# Patient Record
Sex: FEMALE | Race: ASIAN | Employment: STUDENT | URBAN - METROPOLITAN AREA
[De-identification: names, ages, dates, MRNs, and addresses within clinical notes are randomized per-mention and may not be internally consistent; named-entity substitution may affect disease eponyms.]

---
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
## 2020-11-17 PROBLEM — H04.123 DRY EYE SYNDROME OF BOTH EYES: Status: ACTIVE | Noted: 2020-11-17

## 2020-11-17 PROBLEM — M35.1 OVERLAP SYNDROME (HCC): Status: ACTIVE | Noted: 2020-11-17

## 2021-06-23 PROBLEM — F31.9 BIPOLAR DEPRESSION (HCC): Status: ACTIVE | Noted: 2021-06-23

## 2021-06-23 PROBLEM — M32.8 OTHER FORMS OF SYSTEMIC LUPUS ERYTHEMATOSUS (HCC): Status: ACTIVE | Noted: 2021-06-23

## 2022-06-16 ENCOUNTER — OFFICE VISIT (OUTPATIENT)
Dept: RHEUMATOLOGY | Facility: CLINIC | Age: 22
End: 2022-06-16
Payer: COMMERCIAL

## 2022-06-16 VITALS
HEIGHT: 64 IN | HEART RATE: 86 BPM | SYSTOLIC BLOOD PRESSURE: 108 MMHG | WEIGHT: 129 LBS | BODY MASS INDEX: 22.02 KG/M2 | DIASTOLIC BLOOD PRESSURE: 74 MMHG

## 2022-06-16 DIAGNOSIS — M25.50 POLYARTHRALGIA: ICD-10-CM

## 2022-06-16 DIAGNOSIS — M32.9 SYSTEMIC LUPUS ERYTHEMATOSUS, UNSPECIFIED SLE TYPE, UNSPECIFIED ORGAN INVOLVEMENT STATUS (HCC): Primary | ICD-10-CM

## 2022-06-16 RX ORDER — CLINDAMYCIN PHOSPHATE 10 MG/G
1 GEL TOPICAL EVERY MORNING
COMMUNITY
Start: 2022-03-14

## 2022-06-16 RX ORDER — PREDNISONE 10 MG/1
TABLET ORAL
Qty: 20 TABLET | Refills: 0 | Status: SHIPPED | OUTPATIENT
Start: 2022-06-16

## 2022-06-16 RX ORDER — LAMOTRIGINE 200 MG/1
200 TABLET ORAL 2 TIMES DAILY
COMMUNITY
Start: 2022-05-18

## 2022-06-16 RX ORDER — ADAPALENE AND BENZOYL PEROXIDE 3; 25 MG/G; MG/G
GEL TOPICAL DAILY
COMMUNITY
Start: 2022-03-14

## 2022-06-16 NOTE — PATIENT INSTRUCTIONS
You were seen today for lupus   Continue plaquenil  Start prednisone 30 mg daily x3 days then 20 mg daily x3 days then 10 mg daily x3 days then stop  See me in 6-8 weeks

## 2022-08-04 ENCOUNTER — LAB ENCOUNTER (OUTPATIENT)
Dept: LAB | Age: 22
End: 2022-08-04
Attending: INTERNAL MEDICINE
Payer: COMMERCIAL

## 2022-08-04 ENCOUNTER — OFFICE VISIT (OUTPATIENT)
Dept: RHEUMATOLOGY | Facility: CLINIC | Age: 22
End: 2022-08-04
Payer: COMMERCIAL

## 2022-08-04 VITALS
HEART RATE: 97 BPM | BODY MASS INDEX: 22.02 KG/M2 | SYSTOLIC BLOOD PRESSURE: 106 MMHG | WEIGHT: 129 LBS | HEIGHT: 64 IN | DIASTOLIC BLOOD PRESSURE: 84 MMHG

## 2022-08-04 DIAGNOSIS — M32.9 SYSTEMIC LUPUS ERYTHEMATOSUS, UNSPECIFIED SLE TYPE, UNSPECIFIED ORGAN INVOLVEMENT STATUS (HCC): Primary | ICD-10-CM

## 2022-08-04 DIAGNOSIS — Z51.81 ENCOUNTER FOR MEDICATION MONITORING: ICD-10-CM

## 2022-08-04 DIAGNOSIS — M32.9 SYSTEMIC LUPUS ERYTHEMATOSUS, UNSPECIFIED SLE TYPE, UNSPECIFIED ORGAN INVOLVEMENT STATUS (HCC): ICD-10-CM

## 2022-08-04 LAB
ALBUMIN SERPL-MCNC: 4.6 G/DL (ref 3.4–5)
ALT SERPL-CCNC: 17 U/L
AST SERPL-CCNC: 12 U/L (ref 15–37)
BASOPHILS # BLD AUTO: 0.04 X10(3) UL (ref 0–0.2)
BASOPHILS NFR BLD AUTO: 0.7 %
BILIRUB UR QL STRIP.AUTO: NEGATIVE
C3 SERPL-MCNC: 87.2 MG/DL (ref 90–180)
C4 SERPL-MCNC: 23.4 MG/DL (ref 10–40)
CLARITY UR REFRACT.AUTO: CLEAR
COLOR UR AUTO: YELLOW
CREAT BLD-MCNC: 0.84 MG/DL
CRP SERPL-MCNC: <0.29 MG/DL (ref ?–0.3)
EOSINOPHIL # BLD AUTO: 0.03 X10(3) UL (ref 0–0.7)
EOSINOPHIL NFR BLD AUTO: 0.5 %
ERYTHROCYTE [DISTWIDTH] IN BLOOD BY AUTOMATED COUNT: 11.9 %
ERYTHROCYTE [SEDIMENTATION RATE] IN BLOOD: 13 MM/HR
GFR SERPLBLD BASED ON 1.73 SQ M-ARVRAT: 101 ML/MIN/1.73M2 (ref 60–?)
GLUCOSE UR STRIP.AUTO-MCNC: NEGATIVE MG/DL
HCT VFR BLD AUTO: 43.8 %
HGB BLD-MCNC: 14.4 G/DL
IMM GRANULOCYTES # BLD AUTO: 0.01 X10(3) UL (ref 0–1)
IMM GRANULOCYTES NFR BLD: 0.2 %
KETONES UR STRIP.AUTO-MCNC: NEGATIVE MG/DL
LEUKOCYTE ESTERASE UR QL STRIP.AUTO: NEGATIVE
LYMPHOCYTES # BLD AUTO: 2.1 X10(3) UL (ref 1–4)
LYMPHOCYTES NFR BLD AUTO: 35 %
MCH RBC QN AUTO: 30.4 PG (ref 26–34)
MCHC RBC AUTO-ENTMCNC: 32.9 G/DL (ref 31–37)
MCV RBC AUTO: 92.6 FL
MONOCYTES # BLD AUTO: 0.54 X10(3) UL (ref 0.1–1)
MONOCYTES NFR BLD AUTO: 9 %
NEUTROPHILS # BLD AUTO: 3.28 X10 (3) UL (ref 1.5–7.7)
NEUTROPHILS # BLD AUTO: 3.28 X10(3) UL (ref 1.5–7.7)
NEUTROPHILS NFR BLD AUTO: 54.6 %
NITRITE UR QL STRIP.AUTO: NEGATIVE
PH UR STRIP.AUTO: 7.5 [PH] (ref 5–8)
PLATELET # BLD AUTO: 269 10(3)UL (ref 150–450)
PROT UR STRIP.AUTO-MCNC: NEGATIVE MG/DL
RBC # BLD AUTO: 4.73 X10(6)UL
RBC UR QL AUTO: NEGATIVE
SP GR UR STRIP.AUTO: 1.01 (ref 1–1.03)
UROBILINOGEN UR STRIP.AUTO-MCNC: 0.2 MG/DL
WBC # BLD AUTO: 6 X10(3) UL (ref 4–11)

## 2022-08-04 PROCEDURE — 86140 C-REACTIVE PROTEIN: CPT

## 2022-08-04 PROCEDURE — 82565 ASSAY OF CREATININE: CPT

## 2022-08-04 PROCEDURE — 84450 TRANSFERASE (AST) (SGOT): CPT

## 2022-08-04 PROCEDURE — 81003 URINALYSIS AUTO W/O SCOPE: CPT

## 2022-08-04 PROCEDURE — 86160 COMPLEMENT ANTIGEN: CPT

## 2022-08-04 PROCEDURE — 86225 DNA ANTIBODY NATIVE: CPT

## 2022-08-04 PROCEDURE — 85025 COMPLETE CBC W/AUTO DIFF WBC: CPT

## 2022-08-04 PROCEDURE — 82040 ASSAY OF SERUM ALBUMIN: CPT

## 2022-08-04 PROCEDURE — 85652 RBC SED RATE AUTOMATED: CPT

## 2022-08-04 PROCEDURE — 36415 COLL VENOUS BLD VENIPUNCTURE: CPT

## 2022-08-04 PROCEDURE — 84460 ALANINE AMINO (ALT) (SGPT): CPT

## 2022-08-16 LAB — DOUBLE-STRANDED DNA (DSDNA) AB IGG ELISA: 7 IU

## 2022-08-31 ENCOUNTER — TELEPHONE (OUTPATIENT)
Dept: RHEUMATOLOGY | Facility: CLINIC | Age: 22
End: 2022-08-31

## 2022-08-31 NOTE — TELEPHONE ENCOUNTER
Pt on the phone is stating that she has been having neck pain and a headache since Saturday. Pt believes the cause of that is the medication she was prescribed for her UTI. Pt read into the medication side effects and it stated it may cause meningitis and lupus. Pt will like to receive a call back regarding what she is able to do for this issue.  Please advise

## 2022-08-31 NOTE — TELEPHONE ENCOUNTER
Called patient back, she had a UTI about a week ago and developed a lot of neck pain and headache. Started Saturday and the symptoms are going away. She is very emotional on the phone, crying. She called her PCP and the nurse advised her to go to urgent care. No fevers. Also states that her lupus has been bad, she had pinkeye and some dry mouth  Advised to go to the ED, the need to rule out infection. Will likely need imaging.   She agreed

## 2022-09-08 ENCOUNTER — LAB ENCOUNTER (OUTPATIENT)
Dept: LAB | Age: 22
End: 2022-09-08
Attending: INTERNAL MEDICINE
Payer: COMMERCIAL

## 2022-09-08 ENCOUNTER — OFFICE VISIT (OUTPATIENT)
Dept: RHEUMATOLOGY | Facility: CLINIC | Age: 22
End: 2022-09-08
Payer: COMMERCIAL

## 2022-09-08 VITALS
BODY MASS INDEX: 21.34 KG/M2 | HEART RATE: 90 BPM | DIASTOLIC BLOOD PRESSURE: 92 MMHG | HEIGHT: 64 IN | WEIGHT: 125 LBS | SYSTOLIC BLOOD PRESSURE: 117 MMHG

## 2022-09-08 DIAGNOSIS — R39.15 URGENCY OF URINATION: ICD-10-CM

## 2022-09-08 DIAGNOSIS — M32.9 SYSTEMIC LUPUS ERYTHEMATOSUS, UNSPECIFIED SLE TYPE, UNSPECIFIED ORGAN INVOLVEMENT STATUS (HCC): Primary | ICD-10-CM

## 2022-09-08 LAB
BILIRUB UR QL: NEGATIVE
CLARITY UR: CLEAR
COLOR UR: YELLOW
GLUCOSE UR-MCNC: NEGATIVE MG/DL
HGB UR QL STRIP.AUTO: NEGATIVE
KETONES UR-MCNC: NEGATIVE MG/DL
NITRITE UR QL STRIP.AUTO: NEGATIVE
PH UR: 6.5 [PH] (ref 5–8)
PROT UR-MCNC: NEGATIVE MG/DL
SP GR UR STRIP: 1.01 (ref 1–1.03)
UROBILINOGEN UR STRIP-ACNC: 0.2

## 2022-09-08 PROCEDURE — 81001 URINALYSIS AUTO W/SCOPE: CPT | Performed by: INTERNAL MEDICINE

## 2022-09-08 PROCEDURE — 81015 MICROSCOPIC EXAM OF URINE: CPT | Performed by: INTERNAL MEDICINE

## 2022-09-08 NOTE — PATIENT INSTRUCTIONS
You were seen for recent headache and neck pain  The fluid from your spine showed a cell count of greater than 10, it was 13.   The glucose and protein in your spinal fluid was normal.  But this cell count is still high, please discuss with your primary care doctor if meningitis could be a consideration    Regarding her lupus, it does appear stable  We will get the urine test and urine culture  I gave you a referral for pain management

## 2022-09-19 ENCOUNTER — PATIENT MESSAGE (OUTPATIENT)
Dept: RHEUMATOLOGY | Facility: CLINIC | Age: 22
End: 2022-09-19

## 2022-10-12 ENCOUNTER — PATIENT MESSAGE (OUTPATIENT)
Dept: RHEUMATOLOGY | Facility: CLINIC | Age: 22
End: 2022-10-12

## 2022-10-12 DIAGNOSIS — M32.9 SYSTEMIC LUPUS ERYTHEMATOSUS, UNSPECIFIED SLE TYPE, UNSPECIFIED ORGAN INVOLVEMENT STATUS (HCC): ICD-10-CM

## 2022-10-12 DIAGNOSIS — M35.1 OVERLAP SYNDROME (HCC): ICD-10-CM

## 2022-10-12 RX ORDER — HYDROXYCHLOROQUINE SULFATE 200 MG/1
TABLET, FILM COATED ORAL
Qty: 135 TABLET | Refills: 0 | Status: SHIPPED | OUTPATIENT
Start: 2022-10-12

## 2022-10-12 NOTE — TELEPHONE ENCOUNTER
From: Carlin Reis  To: Ema Stone MD  Sent: 10/12/2022 2:26 PM CDT  Subject: Prescription refill    Hello,     I am wondering if it would be possible for a new refill of the hydroxychloroquine 200 mg can be sent to the [facility] on [address], [place], [address]. Thank you.

## 2022-11-21 ENCOUNTER — OFFICE VISIT (OUTPATIENT)
Dept: RHEUMATOLOGY | Facility: CLINIC | Age: 22
End: 2022-11-21
Payer: COMMERCIAL

## 2022-11-21 VITALS
HEART RATE: 102 BPM | BODY MASS INDEX: 21.68 KG/M2 | SYSTOLIC BLOOD PRESSURE: 123 MMHG | DIASTOLIC BLOOD PRESSURE: 90 MMHG | WEIGHT: 127 LBS | HEIGHT: 64 IN

## 2022-11-21 DIAGNOSIS — R39.15 URGENCY OF URINATION: ICD-10-CM

## 2022-11-21 DIAGNOSIS — Z51.81 ENCOUNTER FOR MEDICATION MONITORING: ICD-10-CM

## 2022-11-21 DIAGNOSIS — M32.9 SYSTEMIC LUPUS ERYTHEMATOSUS, UNSPECIFIED SLE TYPE, UNSPECIFIED ORGAN INVOLVEMENT STATUS (HCC): Primary | ICD-10-CM

## 2022-11-21 RX ORDER — PREDNISONE 10 MG/1
TABLET ORAL
Qty: 12 TABLET | Refills: 0 | Status: SHIPPED | OUTPATIENT
Start: 2022-11-21

## 2022-11-21 NOTE — PATIENT INSTRUCTIONS
Were seen today for lupus  Plan to get blood work today  Continue Plaquenil  Try taking prednisone 30 mg daily for 2 days then 20 mg daily for 2 days then 10 mg daily for 2 days to help with some of your joint pain and fatigue

## 2022-11-22 ENCOUNTER — LAB ENCOUNTER (OUTPATIENT)
Dept: LAB | Age: 22
End: 2022-11-22
Attending: INTERNAL MEDICINE
Payer: COMMERCIAL

## 2022-11-22 DIAGNOSIS — R39.15 URGENCY OF URINATION: ICD-10-CM

## 2022-11-22 DIAGNOSIS — Z51.81 ENCOUNTER FOR MEDICATION MONITORING: ICD-10-CM

## 2022-11-22 DIAGNOSIS — M32.9 SYSTEMIC LUPUS ERYTHEMATOSUS, UNSPECIFIED SLE TYPE, UNSPECIFIED ORGAN INVOLVEMENT STATUS (HCC): ICD-10-CM

## 2022-11-22 LAB
ALBUMIN SERPL-MCNC: 4.6 G/DL (ref 3.4–5)
ALT SERPL-CCNC: 17 U/L
AST SERPL-CCNC: 9 U/L (ref 15–37)
BASOPHILS # BLD AUTO: 0.05 X10(3) UL (ref 0–0.2)
BASOPHILS NFR BLD AUTO: 1 %
BILIRUB UR QL STRIP.AUTO: NEGATIVE
C3 SERPL-MCNC: 86.5 MG/DL (ref 90–180)
C4 SERPL-MCNC: 24.1 MG/DL (ref 10–40)
CLARITY UR REFRACT.AUTO: CLEAR
COLOR UR AUTO: YELLOW
CREAT BLD-MCNC: 0.87 MG/DL
CRP SERPL-MCNC: <0.29 MG/DL (ref ?–0.3)
EOSINOPHIL # BLD AUTO: 0.06 X10(3) UL (ref 0–0.7)
EOSINOPHIL NFR BLD AUTO: 1.2 %
ERYTHROCYTE [DISTWIDTH] IN BLOOD BY AUTOMATED COUNT: 11.7 %
ERYTHROCYTE [SEDIMENTATION RATE] IN BLOOD: 1 MM/HR
GFR SERPLBLD BASED ON 1.73 SQ M-ARVRAT: 97 ML/MIN/1.73M2 (ref 60–?)
GLUCOSE UR STRIP.AUTO-MCNC: NEGATIVE MG/DL
HCT VFR BLD AUTO: 44.1 %
HGB BLD-MCNC: 14.5 G/DL
IMM GRANULOCYTES # BLD AUTO: 0.01 X10(3) UL (ref 0–1)
IMM GRANULOCYTES NFR BLD: 0.2 %
KETONES UR STRIP.AUTO-MCNC: NEGATIVE MG/DL
LEUKOCYTE ESTERASE UR QL STRIP.AUTO: NEGATIVE
LYMPHOCYTES # BLD AUTO: 1.67 X10(3) UL (ref 1–4)
LYMPHOCYTES NFR BLD AUTO: 32.7 %
MCH RBC QN AUTO: 30.9 PG (ref 26–34)
MCHC RBC AUTO-ENTMCNC: 32.9 G/DL (ref 31–37)
MCV RBC AUTO: 93.8 FL
MONOCYTES # BLD AUTO: 0.46 X10(3) UL (ref 0.1–1)
MONOCYTES NFR BLD AUTO: 9 %
NEUTROPHILS # BLD AUTO: 2.86 X10 (3) UL (ref 1.5–7.7)
NEUTROPHILS # BLD AUTO: 2.86 X10(3) UL (ref 1.5–7.7)
NEUTROPHILS NFR BLD AUTO: 55.9 %
NITRITE UR QL STRIP.AUTO: NEGATIVE
PH UR STRIP.AUTO: 6 [PH] (ref 5–8)
PLATELET # BLD AUTO: 280 10(3)UL (ref 150–450)
PROT UR STRIP.AUTO-MCNC: NEGATIVE MG/DL
RBC # BLD AUTO: 4.7 X10(6)UL
SP GR UR STRIP.AUTO: <=1.005 (ref 1–1.03)
UROBILINOGEN UR STRIP.AUTO-MCNC: 0.2 MG/DL
WBC # BLD AUTO: 5.1 X10(3) UL (ref 4–11)

## 2022-11-22 PROCEDURE — 82040 ASSAY OF SERUM ALBUMIN: CPT

## 2022-11-22 PROCEDURE — 86225 DNA ANTIBODY NATIVE: CPT

## 2022-11-22 PROCEDURE — 84460 ALANINE AMINO (ALT) (SGPT): CPT

## 2022-11-22 PROCEDURE — 85025 COMPLETE CBC W/AUTO DIFF WBC: CPT

## 2022-11-22 PROCEDURE — 87086 URINE CULTURE/COLONY COUNT: CPT

## 2022-11-22 PROCEDURE — 82565 ASSAY OF CREATININE: CPT

## 2022-11-22 PROCEDURE — 85652 RBC SED RATE AUTOMATED: CPT

## 2022-11-22 PROCEDURE — 86160 COMPLEMENT ANTIGEN: CPT

## 2022-11-22 PROCEDURE — 84450 TRANSFERASE (AST) (SGOT): CPT

## 2022-11-22 PROCEDURE — 81001 URINALYSIS AUTO W/SCOPE: CPT

## 2022-11-22 PROCEDURE — 36415 COLL VENOUS BLD VENIPUNCTURE: CPT

## 2022-11-22 PROCEDURE — 86140 C-REACTIVE PROTEIN: CPT

## 2022-11-22 PROCEDURE — 81015 MICROSCOPIC EXAM OF URINE: CPT

## 2022-11-25 LAB — DSDNA IGG SERPL IA-ACNC: 4.8 IU/ML

## 2023-01-12 DIAGNOSIS — M32.9 SYSTEMIC LUPUS ERYTHEMATOSUS, UNSPECIFIED SLE TYPE, UNSPECIFIED ORGAN INVOLVEMENT STATUS (HCC): ICD-10-CM

## 2023-01-12 DIAGNOSIS — M35.1 OVERLAP SYNDROME (HCC): ICD-10-CM

## 2023-01-13 RX ORDER — HYDROXYCHLOROQUINE SULFATE 200 MG/1
TABLET, FILM COATED ORAL
Qty: 135 TABLET | Refills: 0 | Status: SHIPPED | OUTPATIENT
Start: 2023-01-13

## 2023-01-13 NOTE — TELEPHONE ENCOUNTER
Disp Refills Start End    hydroxychloroquine 200 MG Oral Tab 135 tablet 0 10/12/2022       LOV: 11/21/22  No future appointments.   Labs:   Component      Latest Ref Rng & Units 11/22/2022   WBC      4.0 - 11.0 x10(3) uL 5.1   RBC      3.80 - 5.30 x10(6)uL 4.70   Hemoglobin      12.0 - 16.0 g/dL 14.5   Hematocrit      35.0 - 48.0 % 44.1   Platelet Count      646.1 - 450.0 10(3)uL 280.0   MCV      80.0 - 100.0 fL 93.8   MCH      26.0 - 34.0 pg 30.9   MCHC      31.0 - 37.0 g/dL 32.9   RDW      % 11.7   Prelim Neutrophil Abs      1.50 - 7.70 x10 (3) uL 2.86   Neutrophils Absolute      1.50 - 7.70 x10(3) uL 2.86   Lymphocytes Absolute      1.00 - 4.00 x10(3) uL 1.67   Monocytes Absolute      0.10 - 1.00 x10(3) uL 0.46   Eosinophils Absolute      0.00 - 0.70 x10(3) uL 0.06   Basophils Absolute      0.00 - 0.20 x10(3) uL 0.05   Immature Granulocyte Absolute      0.00 - 1.00 x10(3) uL 0.01   Neutrophils %      % 55.9   Lymphocytes %      % 32.7   Monocytes %      % 9.0   Eosinophils %      % 1.2   Basophils %      % 1.0   Immature Granulocyte %      % 0.2   Color Urine      Yellow Yellow   Clarity Urine      Clear Clear   Spec Gravity      1.001 - 1.030 <=1.005   Glucose Urine      Negative mg/dL Negative   Bilirubin Urine      Negative Negative   Ketones, UA      Negative mg/dL Negative   Blood Urine      Negative Trace-Intact (A)   PH Urine      5.0 - 8.0 6.0   Protein Urine      Negative mg/dL Negative   Urobilinogen Urine      <2.0 mg/dL 0.2   Nitrite Urine      Negative Negative   Leukocyte Esterase Urine      Negative Negative   WBC Urine      0 - 5 /HPF 1-5   RBC Urine      0 - 2 /HPF None Seen   Bacteria Urine      None Seen /HPF Rare (A)   SQUAM EPI CELLS UR      None Seen /HPF Few (A)   RENAL TUBULAR EPITHELIAL CELLS      None Seen /HPF None Seen   TRANSITIONAL EPI CELLS      None Seen /HPF None Seen   YEAST URINE      None Seen /HPF None Seen   CREATININE      0.55 - 1.02 mg/dL 0.87   eGFR-Cr      >=60 mL/min/1.73m2 97   Albumin      3.4 - 5.0 g/dL 4.6   ALT (SGPT)      13 - 56 U/L 17   AST (SGOT)      15 - 37 U/L 9 (L)   C-REACTIVE PROTEIN      <0.30 mg/dL <0.29   SED RATE      0 - 20 mm/Hr 1   Anti-dsDNA Anibody, IGG      <10 IU/mL 4.8   COMPLEMENT C4      10.0 - 40.0 mg/dL 24.1   COMPLEMENT C3      90.0 - 180.0 mg/dL 86.5 (L)   Instructions    Were seen today for lupus  Plan to get blood work today  Continue Plaquenil  Try taking prednisone 30 mg daily for 2 days then 20 mg daily for 2 days then 10 mg daily for 2 days to help with some of your joint pain and fatigue

## 2023-01-20 ENCOUNTER — TELEPHONE (OUTPATIENT)
Dept: RHEUMATOLOGY | Facility: CLINIC | Age: 23
End: 2023-01-20

## 2023-01-20 DIAGNOSIS — M35.1 OVERLAP SYNDROME (HCC): ICD-10-CM

## 2023-01-20 DIAGNOSIS — M32.9 SYSTEMIC LUPUS ERYTHEMATOSUS, UNSPECIFIED SLE TYPE, UNSPECIFIED ORGAN INVOLVEMENT STATUS (HCC): ICD-10-CM

## 2023-01-20 RX ORDER — HYDROXYCHLOROQUINE SULFATE 200 MG/1
TABLET, FILM COATED ORAL
Qty: 135 TABLET | Refills: 0 | Status: SHIPPED | OUTPATIENT
Start: 2023-01-20

## 2023-01-20 NOTE — TELEPHONE ENCOUNTER
Per Gaviota Sher in [facility], patient is with them and awaiting for her hydroxychloroquine and it was wrongfully send to [facility] please refax to [facility] pharmacy on file.

## 2023-01-20 NOTE — TELEPHONE ENCOUNTER
Sent to wrong pool. Pt is not IM or FM clinic pt. Routed to prescribing physician. Med is pended for requested pharmacy.

## 2023-03-15 ENCOUNTER — LAB ENCOUNTER (OUTPATIENT)
Dept: LAB | Facility: HOSPITAL | Age: 23
End: 2023-03-15
Attending: INTERNAL MEDICINE
Payer: COMMERCIAL

## 2023-03-15 ENCOUNTER — OFFICE VISIT (OUTPATIENT)
Dept: RHEUMATOLOGY | Facility: CLINIC | Age: 23
End: 2023-03-15

## 2023-03-15 VITALS
WEIGHT: 115 LBS | DIASTOLIC BLOOD PRESSURE: 84 MMHG | HEIGHT: 64 IN | BODY MASS INDEX: 19.63 KG/M2 | HEART RATE: 86 BPM | SYSTOLIC BLOOD PRESSURE: 116 MMHG

## 2023-03-15 DIAGNOSIS — M32.9 SYSTEMIC LUPUS ERYTHEMATOSUS, UNSPECIFIED SLE TYPE, UNSPECIFIED ORGAN INVOLVEMENT STATUS (HCC): ICD-10-CM

## 2023-03-15 DIAGNOSIS — Z51.81 ENCOUNTER FOR MEDICATION MONITORING: ICD-10-CM

## 2023-03-15 DIAGNOSIS — M32.9 SYSTEMIC LUPUS ERYTHEMATOSUS, UNSPECIFIED SLE TYPE, UNSPECIFIED ORGAN INVOLVEMENT STATUS (HCC): Primary | ICD-10-CM

## 2023-03-15 LAB
ALBUMIN SERPL-MCNC: 4.2 G/DL (ref 3.4–5)
ALT SERPL-CCNC: 13 U/L
AST SERPL-CCNC: 8 U/L (ref 15–37)
BASOPHILS # BLD AUTO: 0.04 X10(3) UL (ref 0–0.2)
BASOPHILS NFR BLD AUTO: 0.9 %
BILIRUB UR QL: NEGATIVE
C3 SERPL-MCNC: 74.6 MG/DL (ref 90–180)
C4 SERPL-MCNC: 21.8 MG/DL (ref 10–40)
CLARITY UR: CLEAR
COLOR UR: YELLOW
CREAT BLD-MCNC: 0.78 MG/DL
CREAT UR-SCNC: 88.7 MG/DL
CRP SERPL-MCNC: <0.29 MG/DL (ref ?–0.3)
DEPRECATED RDW RBC AUTO: 40.1 FL (ref 35.1–46.3)
EOSINOPHIL # BLD AUTO: 0.03 X10(3) UL (ref 0–0.7)
EOSINOPHIL NFR BLD AUTO: 0.7 %
ERYTHROCYTE [DISTWIDTH] IN BLOOD BY AUTOMATED COUNT: 11.8 % (ref 11–15)
ERYTHROCYTE [SEDIMENTATION RATE] IN BLOOD: 1 MM/HR
GFR SERPLBLD BASED ON 1.73 SQ M-ARVRAT: 110 ML/MIN/1.73M2 (ref 60–?)
GLUCOSE UR-MCNC: NEGATIVE MG/DL
HCT VFR BLD AUTO: 40.7 %
HGB BLD-MCNC: 13.6 G/DL
IMM GRANULOCYTES # BLD AUTO: 0.01 X10(3) UL (ref 0–1)
IMM GRANULOCYTES NFR BLD: 0.2 %
KETONES UR-MCNC: NEGATIVE MG/DL
LYMPHOCYTES # BLD AUTO: 1.45 X10(3) UL (ref 1–4)
LYMPHOCYTES NFR BLD AUTO: 33.4 %
MCH RBC QN AUTO: 30.7 PG (ref 26–34)
MCHC RBC AUTO-ENTMCNC: 33.4 G/DL (ref 31–37)
MCV RBC AUTO: 91.9 FL
MONOCYTES # BLD AUTO: 0.33 X10(3) UL (ref 0.1–1)
MONOCYTES NFR BLD AUTO: 7.6 %
NEUTROPHILS # BLD AUTO: 2.48 X10 (3) UL (ref 1.5–7.7)
NEUTROPHILS # BLD AUTO: 2.48 X10(3) UL (ref 1.5–7.7)
NEUTROPHILS NFR BLD AUTO: 57.2 %
NITRITE UR QL STRIP.AUTO: NEGATIVE
PH UR: 6 [PH] (ref 5–8)
PLATELET # BLD AUTO: 249 10(3)UL (ref 150–450)
PROT UR-MCNC: 13.9 MG/DL
PROT UR-MCNC: NEGATIVE MG/DL
PROT/CREAT UR-RTO: 0.16
RBC # BLD AUTO: 4.43 X10(6)UL
SP GR UR STRIP: 1.01 (ref 1–1.03)
UROBILINOGEN UR STRIP-ACNC: <2
VIT C UR-MCNC: NEGATIVE MG/DL
WBC # BLD AUTO: 4.3 X10(3) UL (ref 4–11)

## 2023-03-15 PROCEDURE — 84460 ALANINE AMINO (ALT) (SGPT): CPT

## 2023-03-15 PROCEDURE — 85652 RBC SED RATE AUTOMATED: CPT

## 2023-03-15 PROCEDURE — 82565 ASSAY OF CREATININE: CPT

## 2023-03-15 PROCEDURE — 3074F SYST BP LT 130 MM HG: CPT | Performed by: INTERNAL MEDICINE

## 2023-03-15 PROCEDURE — 99214 OFFICE O/P EST MOD 30 MIN: CPT | Performed by: INTERNAL MEDICINE

## 2023-03-15 PROCEDURE — 86225 DNA ANTIBODY NATIVE: CPT

## 2023-03-15 PROCEDURE — 86140 C-REACTIVE PROTEIN: CPT

## 2023-03-15 PROCEDURE — 82040 ASSAY OF SERUM ALBUMIN: CPT

## 2023-03-15 PROCEDURE — 84156 ASSAY OF PROTEIN URINE: CPT

## 2023-03-15 PROCEDURE — 36415 COLL VENOUS BLD VENIPUNCTURE: CPT

## 2023-03-15 PROCEDURE — 81001 URINALYSIS AUTO W/SCOPE: CPT

## 2023-03-15 PROCEDURE — 86160 COMPLEMENT ANTIGEN: CPT

## 2023-03-15 PROCEDURE — 84450 TRANSFERASE (AST) (SGOT): CPT

## 2023-03-15 PROCEDURE — 85025 COMPLETE CBC W/AUTO DIFF WBC: CPT

## 2023-03-15 PROCEDURE — 82570 ASSAY OF URINE CREATININE: CPT

## 2023-03-15 PROCEDURE — 3008F BODY MASS INDEX DOCD: CPT | Performed by: INTERNAL MEDICINE

## 2023-03-15 PROCEDURE — 3079F DIAST BP 80-89 MM HG: CPT | Performed by: INTERNAL MEDICINE

## 2023-03-15 NOTE — PATIENT INSTRUCTIONS
You were seen for lupus/Sjogren's  Continue Plaquenil  Blood work today  Can follow-up and get blood work in 6 months

## 2023-03-16 ENCOUNTER — OFFICE VISIT (OUTPATIENT)
Dept: SURGERY | Facility: CLINIC | Age: 23
End: 2023-03-16

## 2023-03-16 DIAGNOSIS — R82.90 URINE FINDING: ICD-10-CM

## 2023-03-16 DIAGNOSIS — R35.0 URINARY FREQUENCY: ICD-10-CM

## 2023-03-16 DIAGNOSIS — N30.90 RECURRENT CYSTITIS: Primary | ICD-10-CM

## 2023-03-16 LAB
APPEARANCE: CLEAR
BILIRUBIN: NEGATIVE
GLUCOSE (URINE DIPSTICK): NEGATIVE MG/DL
KETONES (URINE DIPSTICK): NEGATIVE MG/DL
MULTISTIX LOT#: ABNORMAL NUMERIC
NITRITE, URINE: NEGATIVE
PH, URINE: 7 (ref 4.5–8)
PROTEIN (URINE DIPSTICK): NEGATIVE MG/DL
SPECIFIC GRAVITY: 1.01 (ref 1–1.03)
URINE-COLOR: YELLOW
UROBILINOGEN,SEMI-QN: 0.2 MG/DL (ref 0–1.9)

## 2023-03-16 PROCEDURE — 81003 URINALYSIS AUTO W/O SCOPE: CPT | Performed by: PHYSICIAN ASSISTANT

## 2023-03-16 PROCEDURE — 99204 OFFICE O/P NEW MOD 45 MIN: CPT | Performed by: PHYSICIAN ASSISTANT

## 2023-03-16 RX ORDER — SUMATRIPTAN 100 MG/1
1 TABLET, FILM COATED ORAL AS NEEDED
COMMUNITY
Start: 2022-09-09

## 2023-03-17 LAB — DSDNA AB TITR SER: <10 {TITER}

## 2023-04-18 DIAGNOSIS — M32.9 SYSTEMIC LUPUS ERYTHEMATOSUS, UNSPECIFIED SLE TYPE, UNSPECIFIED ORGAN INVOLVEMENT STATUS (HCC): ICD-10-CM

## 2023-04-18 DIAGNOSIS — M35.1 OVERLAP SYNDROME (HCC): ICD-10-CM

## 2023-04-18 NOTE — TELEPHONE ENCOUNTER
Dr Ban Harrington- okay to generate note as requested below? Thank you. 4 = No assist / stand by assistance

## 2023-04-19 RX ORDER — HYDROXYCHLOROQUINE SULFATE 200 MG/1
TABLET, FILM COATED ORAL
Qty: 135 TABLET | Refills: 0 | Status: SHIPPED | OUTPATIENT
Start: 2023-04-19

## 2023-08-08 ENCOUNTER — TELEPHONE (OUTPATIENT)
Dept: RHEUMATOLOGY | Facility: CLINIC | Age: 23
End: 2023-08-08

## 2023-08-08 NOTE — TELEPHONE ENCOUNTER
Pt needs her standing lab orders from Dr Makayla Syed faxed to [facility] lab in [place] at:  Fax: [phone]  Ph: [phone]      Even though labs say [facility] can see them in [facility], [address] said they cannot see them.     Call patient when faxed over at: [phone]

## 2023-08-08 NOTE — TELEPHONE ENCOUNTER
Patient is calling back and states that the fax# that was used was her direct phone number and not the fax # for [facility]. Per patient fax# is going to be as follows.     Fax# [phone]

## 2023-08-11 ENCOUNTER — OFFICE VISIT (OUTPATIENT)
Dept: RHEUMATOLOGY | Facility: CLINIC | Age: 23
End: 2023-08-11

## 2023-08-11 ENCOUNTER — LAB ENCOUNTER (OUTPATIENT)
Dept: LAB | Facility: HOSPITAL | Age: 23
End: 2023-08-11
Attending: INTERNAL MEDICINE
Payer: COMMERCIAL

## 2023-08-11 VITALS
BODY MASS INDEX: 18.46 KG/M2 | DIASTOLIC BLOOD PRESSURE: 84 MMHG | WEIGHT: 108.13 LBS | HEART RATE: 76 BPM | SYSTOLIC BLOOD PRESSURE: 115 MMHG | HEIGHT: 64 IN

## 2023-08-11 DIAGNOSIS — M25.50 POLYARTHRALGIA: ICD-10-CM

## 2023-08-11 DIAGNOSIS — Z51.81 ENCOUNTER FOR MEDICATION MONITORING: ICD-10-CM

## 2023-08-11 DIAGNOSIS — M32.9 SYSTEMIC LUPUS ERYTHEMATOSUS, UNSPECIFIED SLE TYPE, UNSPECIFIED ORGAN INVOLVEMENT STATUS (HCC): Primary | ICD-10-CM

## 2023-08-11 DIAGNOSIS — M32.9 SYSTEMIC LUPUS ERYTHEMATOSUS, UNSPECIFIED SLE TYPE, UNSPECIFIED ORGAN INVOLVEMENT STATUS (HCC): ICD-10-CM

## 2023-08-11 LAB
ALBUMIN SERPL-MCNC: 4.4 G/DL (ref 3.4–5)
ALBUMIN/GLOB SERPL: 1.6 {RATIO} (ref 1–2)
ALP LIVER SERPL-CCNC: 58 U/L
ALT SERPL-CCNC: 14 U/L
ANION GAP SERPL CALC-SCNC: 7 MMOL/L (ref 0–18)
AST SERPL-CCNC: 15 U/L (ref 15–37)
BASOPHILS # BLD AUTO: 0.03 X10(3) UL (ref 0–0.2)
BASOPHILS NFR BLD AUTO: 0.6 %
BILIRUB SERPL-MCNC: 0.5 MG/DL (ref 0.1–2)
BILIRUB UR QL: NEGATIVE
BUN BLD-MCNC: 8 MG/DL (ref 7–18)
BUN/CREAT SERPL: 9.9 (ref 10–20)
C3 SERPL-MCNC: 77.8 MG/DL (ref 90–180)
C4 SERPL-MCNC: 20.8 MG/DL (ref 10–40)
CALCIUM BLD-MCNC: 8.8 MG/DL (ref 8.5–10.1)
CHLORIDE SERPL-SCNC: 105 MMOL/L (ref 98–112)
CLARITY UR: CLEAR
CO2 SERPL-SCNC: 27 MMOL/L (ref 21–32)
CREAT BLD-MCNC: 0.81 MG/DL
CREAT UR-SCNC: 111 MG/DL
CRP SERPL-MCNC: <0.29 MG/DL (ref ?–0.3)
DEPRECATED RDW RBC AUTO: 38.8 FL (ref 35.1–46.3)
EGFRCR SERPLBLD CKD-EPI 2021: 105 ML/MIN/1.73M2 (ref 60–?)
EOSINOPHIL # BLD AUTO: 0.06 X10(3) UL (ref 0–0.7)
EOSINOPHIL NFR BLD AUTO: 1.3 %
ERYTHROCYTE [DISTWIDTH] IN BLOOD BY AUTOMATED COUNT: 11.9 % (ref 11–15)
ERYTHROCYTE [SEDIMENTATION RATE] IN BLOOD: 1 MM/HR
FASTING STATUS PATIENT QL REPORTED: NO
GLOBULIN PLAS-MCNC: 2.7 G/DL (ref 2.8–4.4)
GLUCOSE BLD-MCNC: 81 MG/DL (ref 70–99)
GLUCOSE UR-MCNC: NORMAL MG/DL
HCT VFR BLD AUTO: 41.1 %
HGB BLD-MCNC: 13.8 G/DL
HGB UR QL STRIP.AUTO: NEGATIVE
IMM GRANULOCYTES # BLD AUTO: 0 X10(3) UL (ref 0–1)
IMM GRANULOCYTES NFR BLD: 0 %
KETONES UR-MCNC: NEGATIVE MG/DL
LEUKOCYTE ESTERASE UR QL STRIP.AUTO: 75
LYMPHOCYTES # BLD AUTO: 1.87 X10(3) UL (ref 1–4)
LYMPHOCYTES NFR BLD AUTO: 40 %
MCH RBC QN AUTO: 30.2 PG (ref 26–34)
MCHC RBC AUTO-ENTMCNC: 33.6 G/DL (ref 31–37)
MCV RBC AUTO: 89.9 FL
MONOCYTES # BLD AUTO: 0.47 X10(3) UL (ref 0.1–1)
MONOCYTES NFR BLD AUTO: 10.1 %
NEUTROPHILS # BLD AUTO: 2.24 X10 (3) UL (ref 1.5–7.7)
NEUTROPHILS # BLD AUTO: 2.24 X10(3) UL (ref 1.5–7.7)
NEUTROPHILS NFR BLD AUTO: 48 %
NITRITE UR QL STRIP.AUTO: NEGATIVE
OSMOLALITY SERPL CALC.SUM OF ELEC: 285 MOSM/KG (ref 275–295)
PH UR: 5.5 [PH] (ref 5–8)
PLATELET # BLD AUTO: 227 10(3)UL (ref 150–450)
POTASSIUM SERPL-SCNC: 3.5 MMOL/L (ref 3.5–5.1)
PROT SERPL-MCNC: 7.1 G/DL (ref 6.4–8.2)
PROT UR-MCNC: 16.2 MG/DL
PROT/CREAT UR-RTO: 0.15
RBC # BLD AUTO: 4.57 X10(6)UL
SODIUM SERPL-SCNC: 139 MMOL/L (ref 136–145)
SP GR UR STRIP: 1.01 (ref 1–1.03)
UROBILINOGEN UR STRIP-ACNC: NORMAL
WBC # BLD AUTO: 4.7 X10(3) UL (ref 4–11)

## 2023-08-11 PROCEDURE — 84156 ASSAY OF PROTEIN URINE: CPT

## 2023-08-11 PROCEDURE — 3008F BODY MASS INDEX DOCD: CPT | Performed by: INTERNAL MEDICINE

## 2023-08-11 PROCEDURE — 3079F DIAST BP 80-89 MM HG: CPT | Performed by: INTERNAL MEDICINE

## 2023-08-11 PROCEDURE — 85025 COMPLETE CBC W/AUTO DIFF WBC: CPT | Performed by: INTERNAL MEDICINE

## 2023-08-11 PROCEDURE — 86140 C-REACTIVE PROTEIN: CPT | Performed by: INTERNAL MEDICINE

## 2023-08-11 PROCEDURE — 3074F SYST BP LT 130 MM HG: CPT | Performed by: INTERNAL MEDICINE

## 2023-08-11 PROCEDURE — 99214 OFFICE O/P EST MOD 30 MIN: CPT | Performed by: INTERNAL MEDICINE

## 2023-08-11 PROCEDURE — 36415 COLL VENOUS BLD VENIPUNCTURE: CPT | Performed by: INTERNAL MEDICINE

## 2023-08-11 PROCEDURE — 81001 URINALYSIS AUTO W/SCOPE: CPT | Performed by: INTERNAL MEDICINE

## 2023-08-11 PROCEDURE — 85652 RBC SED RATE AUTOMATED: CPT | Performed by: INTERNAL MEDICINE

## 2023-08-11 PROCEDURE — 82570 ASSAY OF URINE CREATININE: CPT

## 2023-08-11 PROCEDURE — 86160 COMPLEMENT ANTIGEN: CPT | Performed by: INTERNAL MEDICINE

## 2023-08-11 PROCEDURE — 80053 COMPREHEN METABOLIC PANEL: CPT | Performed by: INTERNAL MEDICINE

## 2023-08-11 RX ORDER — MELOXICAM 7.5 MG/1
7.5 TABLET ORAL 2 TIMES DAILY
Qty: 60 TABLET | Refills: 0 | Status: SHIPPED | OUTPATIENT
Start: 2023-08-11

## 2023-08-11 RX ORDER — MELOXICAM 7.5 MG/1
7.5 TABLET ORAL 2 TIMES DAILY
Qty: 60 TABLET | Refills: 0 | Status: SHIPPED | OUTPATIENT
Start: 2023-08-11 | End: 2023-08-11

## 2023-08-11 RX ORDER — METHYLPREDNISOLONE 4 MG/1
TABLET ORAL
Qty: 1 EACH | Refills: 0 | Status: SHIPPED | OUTPATIENT
Start: 2023-08-11

## 2023-08-11 RX ORDER — METHYLPREDNISOLONE 4 MG/1
TABLET ORAL
Qty: 1 EACH | Refills: 0 | Status: SHIPPED | OUTPATIENT
Start: 2023-08-11 | End: 2023-08-11

## 2023-08-11 NOTE — PROGRESS NOTES
Miladys Craig is a 25year old female. HPI:   Patient presents with: Follow - Up  SLE      I had the pleasure of seeing Miladys Craig on 8/11/2023 for follow up SLE/Sjogren's      Current medications:   mg MWF and other days 200 mg daily- since Nov 2020  Blood work:  Neg RF, CCP (2021)  2020 and 2016:  +DION,+RNP>8, +SSA>8, SSB>8  2020: +dsDNA 1:20  2007: +DION 1:1280 speckled, +SSA, SSB    Interval History: This is a 25 yo F with hx of Bipolar disorder presents to establish care for lupus. She was initially diagnosed when she was 6 at that time had a butterfly rash. Never had any biopsy. She was placed on hydroxychloroquine at that time. She would have medication when she was 8 and was in remission. In August 2020 she started to feel fatigue with joint and muscle pain all over. She was following with rheumatologist Dr. Edmundo Soto. She was placed on prednisone for about 10 days which helped her pain. She has been maintained on Plaquenil 400 mg Monday Wednesday Friday and 200 mg the other days. Overall she was doing well on this regimen. In February 2022 she developed appendicitis and since then has been having worsening pain all over. Her joints ache and feel very uncomfortable. Her joints feel like they are burning. No redness or swelling noted. Denies any oral ulcers, lymphadenopathy, hair loss, serositis, DVT or PE, RP or miscarriages. Reports dry eyes and dry mouth but not significant. Last eye exam was in November 2021 8/4/2022:   Presents for follow-up of SLE  During last visit she mentioned worsening joint pain around her thighs and her knees. They would feel like a burning sensation but no redness or swelling  She was given some prednisone during her last visit and it helped with her symptoms. She is doing better overall  She has acne and is seeing a dermatologist.  She was given retinol but noticed that her skin will become more red.   She also noticed that the sun to make it worse    9/8/2022:   Presents for follow-up of SLE  She had a UTI 2 weeks ago and was on antibiotics, then she developed a significant headache and neck pain. She was advised to go to the ED. She was at college at that time  She ended up in the ED and they did an LP which showed total nucleated cell count of 13. Continues to have a headache. No fevers. Feeling very tired and taking several naps  Continues to have hip and knee pain which is chronic. No other active symptoms of lupus  Blood work was done in the ED showing normal ESR and CRP  She continues to have urinary urgency issues    11/21/2022:   Presents for follow-up of SLE  Also being treated for Migraines now on sumatriptan as needed   On HCQ alternating doses 400/200 mg every other day  Having a lot of fatigue and joint pain. Fatigue is worse   Had thyroid test and normal  Joint pain is also worse, mostly in hips and knees  Some pain in hands, not in elbows or shoulders  No new rashes  Also has low appetite and gets nauseous easily  Also having urinary urgency    3/15/2023:   Presents for follow-up of SLE/Sjogren's   On HCQ. Has some joint pain and worse with weather changes. Not debilitating. mostly in hips and knees  Also has some pain in hands ahmet with weather changes. Able to make fist  Still having recurrent UTI, will be seeing urology   Also getting more sores in the mouth and mucoseal, not painful  Was given steroids for her pain but never took it   Feels subjective fevers, uses ice packs almost daily    8/11/2023:   Presents for follow-up of SLE/Sjogren's   On HCQ  In May had a lot of fatigue and pain all over and had to medrol dose pack, it did help the symptoms  Having some pain in the joints ahmet with weather changes. Still functional. Mostly in the knees, hips and hands. No swelling  In June felt very weak, felt different.  Has been under a lot of stress lately   No rashes, sores in mouth  Eyes have been very dry, uses otc eye drops  UTIs has been better        HISTORY:  Past Medical History:   Diagnosis Date    Bipolar depression ([address] 6/23/2021    Depression     as sophomore     Headache     random HA an neck pain due to stress    IUD (intrauterine device) in place 01/11/2022    Kyleena    Lupus [facility]) 2006    no current symptoms, Dx at age of 9. In remission since 6years of age. Other forms of systemic lupus erythematosus ([address] 6/23/2021      Social Hx Reviewed   Family Hx Reviewed     Medications (Active prior to today's visit):  Current Outpatient Medications   Medication Sig Dispense Refill    hydroxychloroquine 200 MG Oral Tab TAKE 2 TABLETS BY MOUTH MONDAY, Luciano Hue. TAKE 1 TABLET BY MOUTH TUESDAY, THURSDAY, SATURDAY, SUNDAY 135 tablet 0    SUMAtriptan Succinate 100 MG Oral Tab Apply 1 Application topically as needed. Clindamycin Phosphate 1 % External Gel Apply 1 Application topically every morning. lamoTRIgine 200 MG Oral Tab Take 1 tablet (200 mg total) by mouth 2 (two) times daily. Adapalene-Benzoyl Peroxide 0.3-2.5 % External Gel Apply topically daily. Clobetasol Propionate 0.05 % External Solution Apply to affected areas of the scalp daily x up to 14 days in a row as needed 25 mL 0    ketoconazole 2 % External Shampoo Apply to the scalp every other day. Leave on for 5 minutes before rinsing. 120 mL 5    QUEtiapine 25 MG Oral Tab Take 1 tablet (25 mg total) by mouth nightly. levonorgestrel (KYLEENA) 19.5 MG Intrauterine IUD 19,500 mcg (1 each total) by Intrauterine route one time. metRONIDAZOLE 500 MG Oral Tab Take 1 tablet (500 mg total) by mouth 2 (two) times daily. For 7 days. 14 tablet 0    famotidine 40 MG Oral Tab Take 1 tablet (40 mg total) by mouth daily.  90 tablet 2    Ciclopirox Olamine 0.77 % External Suspension Apply to affected areas of the scalp daily as needed 60 mL 5    LORazepam 0.5 MG Oral Tab Take 1 tablet (0.5 mg total) by mouth daily as needed for Anxiety. buPROPion HCl ER, XL, 300 MG Oral Tablet 24 Hr TAKE 1 TABLET BY MOUTH ONCE DAILY IN THE MORNING      OLANZapine 2.5 MG Oral Tab Take 1 tablet (2.5 mg total) by mouth nightly. 60 tablet 0    lamoTRIgine 100 MG Oral Tab Take 1 tablet (100 mg total) by mouth 2 (two) times daily. 0    predniSONE 10 MG Oral Tab 30 mg daily x2 days then 20 mg daily x2 days then 10 mg daily x2 days then stop (Patient not taking: Reported on 8/11/2023) 12 tablet 0    predniSONE 10 MG Oral Tab 30 mg daily x3 days then 20 mg daily x3 days then 10 mg daily x3 days then stop (Patient not taking: Reported on 9/8/2022) 20 tablet 0     .cmed  Allergies:  No Known Allergies      ROS:   All other ROS are negative. PHYSICAL EXAM:   GEN: AAOx3, NAD  HEENT: EOMI, PERRLA, no injection or icterus, oral mucosa moist, no oral lesions. No lymphadenopathy. No facial rash  CVS: RRR, no murmurs rubs or gallops. Equal 2+ distal pulses. LUNGS: CTAB, no increased work of breathing  ABDOMEN:  soft NT/ND, +BS, no HSM  SKIN: No rashes or skin lesions. No nail findings  MSK:  Cervical spine: FROM  Hands: no synovitis in DIP, PIP and MCP, strong full fists  Wrist: FROM, no pain or swelling or warmth on palpation  Elbow: FROM, no pain or swelling or warmth on palpation  Shoulders: FROM, no pain or swelling or warmth on palpation  Hip: normal log roll, no lateral hip pain, DAPHNIE test negative b/l  Knees: FROM, no warmth or effusion present. No pain with ROM. Ankles: FROM, no pain or swelling or warmth on palpation  Feet: no pain with MTP squeeze, no toe swelling or pain or warmth on palpation with FROM  Spine: no lumbar or sacral pain on palpation. NEURO: Cranial nerves II-XII intact grossly. 5/5 strength throughout in both upper and lower extremities, sensation intact.   PSYCH: normal mood       LABS:       Component      Latest Ref Rng & Units 11/6/2020 10/6/2020 1/30/2016   Anti-dsDNA      0 - 4 IU/mL   1     Anti-Sm      0.0-<1.0 AI   0.4 Anti-SmRNP      0.0-<1.0 AI   1.4 (H)     Anti-SS-A (Ro)      0.0-<1.0 AI   >8.0 (H)     Anti-SS-B (La)      0.0-<1.0 AI   >8.0 (H)     Anti-Centromere B      0.0-<1.0 AI   <0.2     Anti-Scl-70      0.0-<1.0 AI   <0.2     Anti-Chromatin      0.0-<1.0 AI   <0.2     Anti-Ribosomal P      0.0-<1.0 AI   <0.2     Anti-RNP      0.0-<1.0 AI   >8.0 (H)     Anti-MICKI-1      0.0-<1.0 AI   <0.2     SSA AUTOAB      <100 AU/mL     391 (H)   SSB AUTOAB      <100 AU/mL     486 (H)   Sm AUTOAB ([place])      0 - 99 AU/mL         RNP AUTOAB      <100 AU/mL     144 (H)   Scl-70 AUTOAB      0 - 99 AU/mL         MICKI-1 AUTOAB      0 - 99 AU/mL         dsDNA AUTOAB      0 - 99 IU/mL         CENTROMERE AUTOAB      0 - 99 AU/mL         HISTONE AUTOAB      0 - 99 AU/mL         DION PROFILE      NEGATIVE         COMPLEMENT C4      14.0 - 28.0 mg/dL 24.0       COMPLEMENT C3      51.0 - 160.0 mg/dl 117.0       DION SCREEN      Negative   Positive (A) Positive (A)   dsDNA Crithidia luciliae IFA      Negative Positive (A)       dsDNA Crithidia luciliae Titer      Neg<1:10 1:20 (H)          Component      Latest Ref Rng & Units 9/12/2011   Anti-dsDNA      0 - 4 IU/mL     Anti-Sm      0.0-<1.0 AI     Anti-SmRNP      0.0-<1.0 AI     Anti-SS-A (Ro)      0.0-<1.0 AI     Anti-SS-B (La)      0.0-<1.0 AI     Anti-Centromere B      0.0-<1.0 AI     Anti-Scl-70      0.0-<1.0 AI     Anti-Chromatin      0.0-<1.0 AI     Anti-Ribosomal P      0.0-<1.0 AI     Anti-RNP      0.0-<1.0 AI     Anti-MICKI-1      0.0-<1.0 AI     SSA AUTOAB      <100 AU/mL 617 (H)   SSB AUTOAB      <100 AU/mL 333 (H)   Sm AUTOAB ([place])      0 - 99 AU/mL <100   RNP AUTOAB      <100 AU/mL <100   Scl-70 AUTOAB      0 - 99 AU/mL <100   MICKI-1 AUTOAB      0 - 99 AU/mL <100   dsDNA AUTOAB      0 - 99 IU/mL <100   CENTROMERE AUTOAB      0 - 99 AU/mL <100   HISTONE AUTOAB      0 - 99 AU/mL <100   DION PROFILE      NEGATIVE POSITIVE (H)   COMPLEMENT C4      14.0 - 28.0 mg/dL 21.6   COMPLEMENT C3 51.0 - 160.0 mg/dl 104.0   DION SCREEN      Negative     dsDNA Crithidia luciliae IFA      Negative     dsDNA Crithidia luciliae Titer      Neg<1:10        Component      Latest Ref Rng & Units 11/24/2021 0/14/1279 73/6/4396   Cyclic Citrullinated Peptides IgG Quantitative      <3.0 U/mL     0.5   Cyclic Citrullinated Peptides IgG Qualitative      Negative     Negative   dsDNA Crithidia luciliae IFA      Negative Negative   Positive (A)   COMPLEMENT C3      51.0 - 160.0 mg/dl 105.0 104.0 117.0   COMPLEMENT C4      14.0 - 28.0 mg/dL 27.0 22.0 24.0   RHEUMATOID FACTOR      <=14 IU/mL     <10   Anti-dsDNA      0 - 4 IU/mL   2     SED RATE      0 - 20 mm/hr   5     C-Reactive Protein      <=0.50 mg/dL   <0.30        Imaging:     [id number]    ASSESSMENT/PLAN:     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  - RNP was also positive at [facility] but again likely falsely positive  - Was also found to have a + double-stranded DNA by crithidia  - Has been on Plaquenil alternating doses of 400 mg and 200 mg every other day. Restarted 11/2020  - No kidney involvement  - seen by ophthalmology for Plaquenil exam 2/2023, Dr. Author Niño   - She was given a prednisone taper for some of her joint pain and took it recently which helped  - She will be prescribed meloxicam 7.5 mg once or twice a day for her pain. - Blood work today and every 6 mos      Sicca symptoms, likely Sjogren's overlap     Urinary urgency- improved  - Has had recurrent urine infections. Has a lot of urgency at times.       Pt will f/u in 6 mos     Alejandro Ruiz MD  8/11/2023  9:40 AM

## 2023-09-11 ENCOUNTER — OFFICE VISIT (OUTPATIENT)
Dept: SURGERY | Facility: CLINIC | Age: 23
End: 2023-09-11

## 2023-09-11 DIAGNOSIS — R39.89 SENSATION OF PRESSURE IN BLADDER AREA: ICD-10-CM

## 2023-09-11 DIAGNOSIS — R82.90 URINE FINDING: ICD-10-CM

## 2023-09-11 DIAGNOSIS — N30.90 RECURRENT CYSTITIS: Primary | ICD-10-CM

## 2023-09-11 LAB
APPEARANCE: CLEAR
BILIRUBIN: NEGATIVE
GLUCOSE (URINE DIPSTICK): NEGATIVE MG/DL
KETONES (URINE DIPSTICK): NEGATIVE MG/DL
LEUKOCYTES: NEGATIVE
MULTISTIX LOT#: NORMAL NUMERIC
NITRITE, URINE: NEGATIVE
OCCULT BLOOD: NEGATIVE
PH, URINE: 6 (ref 4.5–8)
PROTEIN (URINE DIPSTICK): NEGATIVE MG/DL
SPECIFIC GRAVITY: 1 (ref 1–1.03)
URINE-COLOR: YELLOW
UROBILINOGEN,SEMI-QN: 0.2 MG/DL (ref 0–1.9)

## 2023-09-11 PROCEDURE — 99214 OFFICE O/P EST MOD 30 MIN: CPT | Performed by: PHYSICIAN ASSISTANT

## 2023-09-11 PROCEDURE — 81003 URINALYSIS AUTO W/O SCOPE: CPT | Performed by: PHYSICIAN ASSISTANT

## 2023-09-11 RX ORDER — AZELAIC ACID 0.15 G/G
GEL TOPICAL
COMMUNITY
Start: 2023-07-20

## 2023-09-11 RX ORDER — D-MANNOSE 99 %
POWDER (GRAM) MISCELLANEOUS
COMMUNITY

## 2023-09-12 ENCOUNTER — TELEPHONE (OUTPATIENT)
Dept: SURGERY | Facility: CLINIC | Age: 23
End: 2023-09-12

## 2023-09-13 ENCOUNTER — TELEPHONE (OUTPATIENT)
Dept: SURGERY | Facility: CLINIC | Age: 23
End: 2023-09-13

## 2023-09-15 ENCOUNTER — TELEPHONE (OUTPATIENT)
Dept: SURGERY | Facility: CLINIC | Age: 23
End: 2023-09-15

## 2023-10-12 DIAGNOSIS — M32.9 SYSTEMIC LUPUS ERYTHEMATOSUS, UNSPECIFIED SLE TYPE, UNSPECIFIED ORGAN INVOLVEMENT STATUS (HCC): ICD-10-CM

## 2023-10-12 DIAGNOSIS — M35.1 OVERLAP SYNDROME (HCC): ICD-10-CM

## 2023-10-12 RX ORDER — HYDROXYCHLOROQUINE SULFATE 200 MG/1
TABLET, FILM COATED ORAL
Qty: 135 TABLET | Refills: 0 | Status: SHIPPED | OUTPATIENT
Start: 2023-10-12

## 2023-10-12 NOTE — TELEPHONE ENCOUNTER
Requested Prescriptions     Pending Prescriptions Disp Refills    hydroxychloroquine 200 MG Oral Tab 135 tablet 0     Sig: TAKE 2 TABLETS BY MOUTH MONDAY, [address], FRIDAY. TAKE 1 TABLET BY MOUTH TUESDAY, THURSDAY, SATURDAY, SUNDAY     LF: 4/19/23 #135 TAB W/ 0 RF  LOV: 8/11/23   No future appointments.   Labs:   Component      Latest Ref Rng 8/11/2023   WBC      4.0 - 11.0 x10(3) uL 4.7    RBC      3.80 - 5.30 x10(6)uL 4.57    Hemoglobin      12.0 - 16.0 g/dL 13.8    Hematocrit      35.0 - 48.0 % 41.1    MCV      80.0 - 100.0 fL 89.9    MCH      26.0 - 34.0 pg 30.2    MCHC      31.0 - 37.0 g/dL 33.6    RDW-SD      35.1 - 46.3 fL 38.8    RDW      11.0 - 15.0 % 11.9    Platelet Count      126.0 - 450.0 10(3)uL 227.0    Prelim Neutrophil Abs      1.50 - 7.70 x10 (3) uL 2.24    Neutrophils Absolute      1.50 - 7.70 x10(3) uL 2.24    Lymphocytes Absolute      1.00 - 4.00 x10(3) uL 1.87    Monocytes Absolute      0.10 - 1.00 x10(3) uL 0.47    Eosinophils Absolute      0.00 - 0.70 x10(3) uL 0.06    Basophils Absolute      0.00 - 0.20 x10(3) uL 0.03    Immature Granulocyte Absolute      0.00 - 1.00 x10(3) uL 0.00    Neutrophils %      % 48.0    Lymphocytes %      % 40.0    Monocytes %      % 10.1    Eosinophils %      % 1.3    Basophils %      % 0.6    Immature Granulocyte %      % 0.0    Glucose      70 - 99 mg/dL 81    Sodium      136 - 145 mmol/L 139    Potassium      3.5 - 5.1 mmol/L 3.5    Chloride      98 - 112 mmol/L 105    Carbon Dioxide, Total      21.0 - 32.0 mmol/L 27.0    ANION GAP      0 - 18 mmol/L 7    BUN      7 - 18 mg/dL 8    CREATININE      0.55 - 1.02 mg/dL 0.81    BUN/CREATININE RATIO      10.0 - 20.0  9.9 (L)    CALCIUM      8.5 - 10.1 mg/dL 8.8    CALCULATED OSMOLALITY      275 - 295 mOsm/kg 285    EGFR      >=60 mL/min/1.73m2 105    ALT (SGPT)      13 - 56 U/L 14    AST (SGOT)      15 - 37 U/L 15    ALKALINE PHOSPHATASE      52 - 144 U/L 58    Total Bilirubin      0.1 - 2.0 mg/dL 0.5    PROTEIN, TOTAL      6.4 - 8.2 g/dL 7.1    Albumin      3.4 - 5.0 g/dL 4.4    Globulin      2.8 - 4.4 g/dL 2.7 (L)    A/G Ratio      1.0 - 2.0  1.6    Patient Fasting for CMP? No    Color Urine      Yellow  Light-Yellow    Clarity Urine      Clear  Clear    Spec Gravity      1.005 - 1.030  1.010    Glucose Urine      Normal mg/dL Normal    Bilirubin Urine      Negative  Negative    Ketones, UA      Negative mg/dL Negative    Blood Urine      Negative  Negative    PH Urine      5.0 - 8.0  5.5    Protein Urine      Negative mg/dL Trace ! Urobilinogen Urine      Normal  Normal    Nitrite Urine      Negative  Negative    Leukocyte Esterase       Negative  75 ! WBC Urine      0 - 5 /HPF 1-5    RBC Urine      0 - 2 /HPF 0-2    Bacteria Urine      None Seen /HPF 1+ ! SQUAM EPI CELLS UR      None Seen /HPF Few ! TOTAL PROTEIN URINE RANDOM      mg/dL 16.2    CREATININE UR RANDOM      mg/dL 111.00    Urine Protein/Creatinine Ratio, Random 0.15    C-REACTIVE PROTEIN      <0.30 mg/dL <0.29    SED RATE      0 - 20 mm/Hr 1    COMPLEMENT C3      90.0 - 180.0 mg/dL 77.8 (L)    COMPLEMENT C4      10.0 - 40.0 mg/dL 20.8       Legend:  (L) Low  ! 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  - RNP was also positive at [facility] but again likely falsely positive  - Was also found to have a + double-stranded DNA by crithidia  - Has been on Plaquenil alternating doses of 400 mg and 200 mg every other day.   Restarted 11/2020  - No kidney involvement  - seen by ophthalmology for Plaquenil exam 2/2023, Dr. Author Niño   - She was given a prednisone taper for some of her joint pain and took it recently which helped  - She will be prescribed meloxicam 7.5 mg once or twice a day for her pain.  - Blood work today and every 6 mos      Sicca symptoms, likely Sjogren's overlap     Urinary urgency- improved  - Has had recurrent urine infections. Has a lot of urgency at times.        Pt will f/u in 6 mos      Merlinda Fontana, MD  8/11/2023  9:40 AM

## 2023-12-22 ENCOUNTER — LAB ENCOUNTER (OUTPATIENT)
Dept: LAB | Age: 23
End: 2023-12-22
Attending: PHYSICIAN ASSISTANT
Payer: COMMERCIAL

## 2023-12-22 ENCOUNTER — PATIENT MESSAGE (OUTPATIENT)
Dept: SURGERY | Facility: CLINIC | Age: 23
End: 2023-12-22

## 2023-12-22 ENCOUNTER — TELEPHONE (OUTPATIENT)
Dept: SURGERY | Facility: CLINIC | Age: 23
End: 2023-12-22

## 2023-12-22 DIAGNOSIS — R39.89 SENSATION OF PRESSURE IN BLADDER AREA: ICD-10-CM

## 2023-12-22 DIAGNOSIS — R39.89 SENSATION OF PRESSURE IN BLADDER AREA: Primary | ICD-10-CM

## 2023-12-22 LAB
BILIRUB UR QL STRIP.AUTO: NEGATIVE
CLARITY UR REFRACT.AUTO: CLEAR
COLOR UR AUTO: COLORLESS
GLUCOSE UR STRIP.AUTO-MCNC: NORMAL MG/DL
KETONES UR STRIP.AUTO-MCNC: NEGATIVE MG/DL
LEUKOCYTE ESTERASE UR QL STRIP.AUTO: NEGATIVE
NITRITE UR QL STRIP.AUTO: NEGATIVE
PH UR STRIP.AUTO: 6.5 [PH] (ref 5–8)
PROT UR STRIP.AUTO-MCNC: NEGATIVE MG/DL
RBC UR QL AUTO: NEGATIVE
SP GR UR STRIP.AUTO: 1.01 (ref 1–1.03)
UROBILINOGEN UR STRIP.AUTO-MCNC: NORMAL MG/DL

## 2023-12-22 PROCEDURE — 87086 URINE CULTURE/COLONY COUNT: CPT

## 2023-12-22 PROCEDURE — 81003 URINALYSIS AUTO W/O SCOPE: CPT

## 2023-12-26 ENCOUNTER — OFFICE VISIT (OUTPATIENT)
Dept: OBGYN CLINIC | Facility: CLINIC | Age: 23
End: 2023-12-26
Payer: COMMERCIAL

## 2023-12-26 VITALS
DIASTOLIC BLOOD PRESSURE: 60 MMHG | BODY MASS INDEX: 19.63 KG/M2 | SYSTOLIC BLOOD PRESSURE: 102 MMHG | HEART RATE: 101 BPM | HEIGHT: 64 IN | WEIGHT: 115 LBS

## 2023-12-26 DIAGNOSIS — Z01.419 ENCOUNTER FOR WELL WOMAN EXAM WITH ROUTINE GYNECOLOGICAL EXAM: Primary | ICD-10-CM

## 2023-12-26 DIAGNOSIS — Z30.431 ENCOUNTER FOR ROUTINE CHECKING OF INTRAUTERINE CONTRACEPTIVE DEVICE (IUD): ICD-10-CM

## 2023-12-26 PROCEDURE — 3008F BODY MASS INDEX DOCD: CPT | Performed by: OBSTETRICS & GYNECOLOGY

## 2023-12-26 PROCEDURE — 99385 PREV VISIT NEW AGE 18-39: CPT | Performed by: OBSTETRICS & GYNECOLOGY

## 2023-12-26 PROCEDURE — 3074F SYST BP LT 130 MM HG: CPT | Performed by: OBSTETRICS & GYNECOLOGY

## 2023-12-26 PROCEDURE — 3078F DIAST BP <80 MM HG: CPT | Performed by: OBSTETRICS & GYNECOLOGY

## 2023-12-26 RX ORDER — QUETIAPINE FUMARATE 50 MG/1
100 TABLET, EXTENDED RELEASE ORAL NIGHTLY
COMMUNITY
Start: 2023-12-22

## 2023-12-26 RX ORDER — CLINDAMYCIN PHOSPHATE 10 UG/ML
LOTION TOPICAL
COMMUNITY
Start: 2023-12-12

## 2023-12-27 ENCOUNTER — OFFICE VISIT (OUTPATIENT)
Dept: INTERNAL MEDICINE CLINIC | Facility: CLINIC | Age: 23
End: 2023-12-27
Payer: COMMERCIAL

## 2023-12-27 VITALS
BODY MASS INDEX: 19.63 KG/M2 | HEIGHT: 64.25 IN | RESPIRATION RATE: 16 BRPM | HEART RATE: 92 BPM | WEIGHT: 115 LBS | SYSTOLIC BLOOD PRESSURE: 90 MMHG | TEMPERATURE: 98 F | OXYGEN SATURATION: 98 % | DIASTOLIC BLOOD PRESSURE: 68 MMHG

## 2023-12-27 DIAGNOSIS — K59.09 CHRONIC CONSTIPATION: ICD-10-CM

## 2023-12-27 DIAGNOSIS — Z00.00 LABORATORY EXAM ORDERED AS PART OF ROUTINE GENERAL MEDICAL EXAMINATION: ICD-10-CM

## 2023-12-27 DIAGNOSIS — Z00.00 ENCOUNTER FOR ROUTINE ADULT MEDICAL EXAMINATION: Primary | ICD-10-CM

## 2023-12-27 DIAGNOSIS — H61.23 BILATERAL IMPACTED CERUMEN: ICD-10-CM

## 2023-12-27 PROCEDURE — 99385 PREV VISIT NEW AGE 18-39: CPT | Performed by: INTERNAL MEDICINE

## 2023-12-27 PROCEDURE — 3008F BODY MASS INDEX DOCD: CPT | Performed by: INTERNAL MEDICINE

## 2023-12-27 PROCEDURE — 3078F DIAST BP <80 MM HG: CPT | Performed by: INTERNAL MEDICINE

## 2023-12-27 PROCEDURE — 3074F SYST BP LT 130 MM HG: CPT | Performed by: INTERNAL MEDICINE

## 2023-12-27 NOTE — PATIENT INSTRUCTIONS
Blood work     Debrox or murine ear wax removal drops - use both sides     Increase miralax dosing - start with 3 tablespoons/day and can increase to half capful or full capful daily if needed  Follow up with gastroenterology/[facility] Dr. Dennis Buchanan if symptoms persist   [phone] is office number

## 2024-01-02 ENCOUNTER — LAB ENCOUNTER (OUTPATIENT)
Dept: LAB | Age: 24
End: 2024-01-02
Attending: INTERNAL MEDICINE
Payer: COMMERCIAL

## 2024-01-02 DIAGNOSIS — Z00.00 LABORATORY EXAM ORDERED AS PART OF ROUTINE GENERAL MEDICAL EXAMINATION: ICD-10-CM

## 2024-01-02 LAB
CHOLEST SERPL-MCNC: 99 MG/DL (ref ?–200)
FASTING PATIENT LIPID ANSWER: NO
HDLC SERPL-MCNC: 59 MG/DL (ref 40–59)
LDLC SERPL CALC-MCNC: 29 MG/DL (ref ?–100)
NONHDLC SERPL-MCNC: 40 MG/DL (ref ?–130)
T4 FREE SERPL-MCNC: 0.9 NG/DL (ref 0.8–1.7)
TRIGL SERPL-MCNC: 40 MG/DL (ref 30–149)
TSI SER-ACNC: 3.95 MIU/ML (ref 0.36–3.74)
VLDLC SERPL CALC-MCNC: 5 MG/DL (ref 0–30)

## 2024-01-02 PROCEDURE — 36415 COLL VENOUS BLD VENIPUNCTURE: CPT

## 2024-01-02 PROCEDURE — 84443 ASSAY THYROID STIM HORMONE: CPT

## 2024-01-02 PROCEDURE — 84439 ASSAY OF FREE THYROXINE: CPT

## 2024-01-02 PROCEDURE — 80061 LIPID PANEL: CPT

## 2024-01-04 DIAGNOSIS — R79.89 ELEVATED TSH: Primary | ICD-10-CM

## 2024-01-08 DIAGNOSIS — M32.9 SYSTEMIC LUPUS ERYTHEMATOSUS, UNSPECIFIED SLE TYPE, UNSPECIFIED ORGAN INVOLVEMENT STATUS (HCC): ICD-10-CM

## 2024-01-08 DIAGNOSIS — M35.1 OVERLAP SYNDROME (HCC): ICD-10-CM

## 2024-01-08 NOTE — TELEPHONE ENCOUNTER
LOV: 8/11/2023  No future appointments.  Labs:    Component      Latest Ref Rng 8/11/2023   WBC      4.0 - 11.0 x10(3) uL 4.7    RBC      3.80 - 5.30 x10(6)uL 4.57    Hemoglobin      12.0 - 16.0 g/dL 13.8    Hematocrit      35.0 - 48.0 % 41.1    MCV      80.0 - 100.0 fL 89.9    MCH      26.0 - 34.0 pg 30.2    MCHC      31.0 - 37.0 g/dL 33.6    RDW-SD      35.1 - 46.3 fL 38.8    RDW      11.0 - 15.0 % 11.9    Platelet Count      150.0 - 450.0 10(3)uL 227.0    Prelim Neutrophil Abs      1.50 - 7.70 x10 (3) uL 2.24    Neutrophils Absolute      1.50 - 7.70 x10(3) uL 2.24    Lymphocytes Absolute      1.00 - 4.00 x10(3) uL 1.87    Monocytes Absolute      0.10 - 1.00 x10(3) uL 0.47    Eosinophils Absolute      0.00 - 0.70 x10(3) uL 0.06    Basophils Absolute      0.00 - 0.20 x10(3) uL 0.03    Immature Granulocyte Absolute      0.00 - 1.00 x10(3) uL 0.00    Neutrophils %      % 48.0    Lymphocytes %      % 40.0    Monocytes %      % 10.1    Eosinophils %      % 1.3    Basophils %      % 0.6    Immature Granulocyte %      % 0.0    Glucose      70 - 99 mg/dL 81    Sodium      136 - 145 mmol/L 139    Potassium      3.5 - 5.1 mmol/L 3.5    Chloride      98 - 112 mmol/L 105    Carbon Dioxide, Total      21.0 - 32.0 mmol/L 27.0    ANION GAP      0 - 18 mmol/L 7    BUN      7 - 18 mg/dL 8    CREATININE      0.55 - 1.02 mg/dL 0.81    BUN/CREATININE RATIO      10.0 - 20.0  9.9 (L)    CALCIUM      8.5 - 10.1 mg/dL 8.8    CALCULATED OSMOLALITY      275 - 295 mOsm/kg 285    EGFR      >=60 mL/min/1.73m2 105    ALT (SGPT)      13 - 56 U/L 14    AST (SGOT)      15 - 37 U/L 15    ALKALINE PHOSPHATASE      52 - 144 U/L 58    Total Bilirubin      0.1 - 2.0 mg/dL 0.5    PROTEIN, TOTAL      6.4 - 8.2 g/dL 7.1    Albumin      3.4 - 5.0 g/dL 4.4    Globulin      2.8 - 4.4 g/dL 2.7 (L)    A/G Ratio      1.0 - 2.0  1.6    Patient Fasting for CMP? No    Color Urine      Yellow  Light-Yellow    Clarity Urine      Clear  Clear    Spec Gravity       1.005 - 1.030  1.010    Glucose Urine      Normal mg/dL Normal    Bilirubin Urine      Negative  Negative    Ketones, UA      Negative mg/dL Negative    Blood Urine      Negative  Negative    PH Urine      5.0 - 8.0  5.5    Protein Urine      Negative mg/dL Trace !    Urobilinogen Urine      Normal  Normal    Nitrite Urine      Negative  Negative    Leukocyte Esterase       Negative  75 !    WBC Urine      0 - 5 /HPF 1-5    RBC Urine      0 - 2 /HPF 0-2    Bacteria Urine      None Seen /HPF 1+ !    SQUAM EPI CELLS UR      None Seen /HPF Few !    TOTAL PROTEIN URINE RANDOM      mg/dL 16.2    CREATININE UR RANDOM      mg/dL 111.00    Urine Protein/Creatinine Ratio, Random 0.15    C-REACTIVE PROTEIN      <0.30 mg/dL <0.29    SED RATE      0 - 20 mm/Hr 1    COMPLEMENT C3      90.0 - 180.0 mg/dL 77.8 (L)    COMPLEMENT C4      10.0 - 40.0 mg/dL 20.8       Legend:  (L) Low  ! Abnormal

## 2024-01-08 NOTE — TELEPHONE ENCOUNTER
Current Outpatient Medications   Medication Sig Dispense Refill    hydroxychloroquine 200 MG Oral Tab TAKE 2 TABLETS BY MOUTH MONDAY, WEDNESDAY, FRIDAY. TAKE 1 TABLET BY MOUTH TUESDAY, THURSDAY, SATURDAY, SUNDAY 135 tablet 0

## 2024-01-10 RX ORDER — HYDROXYCHLOROQUINE SULFATE 200 MG/1
TABLET, FILM COATED ORAL
Qty: 135 TABLET | Refills: 0 | Status: SHIPPED | OUTPATIENT
Start: 2024-01-10

## 2024-03-06 RX ORDER — MELOXICAM 7.5 MG/1
7.5 TABLET ORAL 2 TIMES DAILY
Qty: 60 TABLET | Refills: 0 | Status: SHIPPED | OUTPATIENT
Start: 2024-03-06

## 2024-03-06 NOTE — TELEPHONE ENCOUNTER
Requested Prescriptions     Pending Prescriptions Disp Refills    Meloxicam 7.5 MG Oral Tab 60 tablet 0     Sig: Take 1 tablet (7.5 mg total) by mouth in the morning and 1 tablet (7.5 mg total) before bedtime.      LOV: 08/11/2023  Last Refill: 08/11/2023 #[id number]   Labs:    Component      Latest Ref Rng 8/11/2023   WBC      4.0 - 11.0 x10(3) uL 4.7    RBC      3.80 - 5.30 x10(6)uL 4.57    Hemoglobin      12.0 - 16.0 g/dL 13.8    Hematocrit      35.0 - 48.0 % 41.1    MCV      80.0 - 100.0 fL 89.9    MCH      26.0 - 34.0 pg 30.2    MCHC      31.0 - 37.0 g/dL 33.6    RDW-SD      35.1 - 46.3 fL 38.8    RDW      11.0 - 15.0 % 11.9    Platelet Count      150.0 - 450.0 10(3)uL 227.0    Prelim Neutrophil Abs      1.50 - 7.70 x10 (3) uL 2.24    Neutrophils Absolute      1.50 - 7.70 x10(3) uL 2.24    Lymphocytes Absolute      1.00 - 4.00 x10(3) uL 1.87    Monocytes Absolute      0.10 - 1.00 x10(3) uL 0.47    Eosinophils Absolute      0.00 - 0.70 x10(3) uL 0.06    Basophils Absolute      0.00 - 0.20 x10(3) uL 0.03    Immature Granulocyte Absolute      0.00 - 1.00 x10(3) uL 0.00    Neutrophils %      % 48.0    Lymphocytes %      % 40.0    Monocytes %      % 10.1    Eosinophils %      % 1.3    Basophils %      % 0.6    Immature Granulocyte %      % 0.0    Glucose      70 - 99 mg/dL 81    Sodium      136 - 145 mmol/L 139    Potassium      3.5 - 5.1 mmol/L 3.5    Chloride      98 - 112 mmol/L 105    Carbon Dioxide, Total      21.0 - 32.0 mmol/L 27.0    ANION GAP      0 - 18 mmol/L 7    BUN      7 - 18 mg/dL 8    CREATININE      0.55 - 1.02 mg/dL 0.81    BUN/CREATININE RATIO      10.0 - 20.0  9.9 (L)    CALCIUM      8.5 - 10.1 mg/dL 8.8    CALCULATED OSMOLALITY      275 - 295 mOsm/kg 285    EGFR      >=60 mL/min/1.73m2 105    ALT (SGPT)      13 - 56 U/L 14    AST (SGOT)      15 - 37 U/L 15    ALKALINE PHOSPHATASE      52 - 144 U/L 58    Total Bilirubin      0.1 - 2.0 mg/dL 0.5    PROTEIN, TOTAL      6.4 - 8.2 g/dL 7.1    Albumin       3.4 - 5.0 g/dL 4.4    Globulin      2.8 - 4.4 g/dL 2.7 (L)    A/G Ratio      1.0 - 2.0  1.6    Patient Fasting for CMP? No    Color Urine      Yellow  Light-Yellow    Clarity Urine      Clear  Clear    Spec Gravity      1.005 - 1.030  1.010    Glucose Urine      Normal mg/dL Normal    Bilirubin Urine      Negative  Negative    Ketones, UA      Negative mg/dL Negative    Blood Urine      Negative  Negative    PH Urine      5.0 - 8.0  5.5    Protein Urine      Negative mg/dL Trace !    Urobilinogen Urine      Normal  Normal    Nitrite Urine      Negative  Negative    Leukocyte Esterase       Negative  75 !    WBC Urine      0 - 5 /HPF 1-5    RBC Urine      0 - 2 /HPF 0-2    Bacteria Urine      None Seen /HPF 1+ !    SQUAM EPI CELLS UR      None Seen /HPF Few !    TOTAL PROTEIN URINE RANDOM      mg/dL 16.2    CREATININE UR RANDOM      mg/dL 111.00    Urine Protein/Creatinine Ratio, Random 0.15    C-REACTIVE PROTEIN      <0.30 mg/dL <0.29    SED RATE      0 - 20 mm/Hr 1    COMPLEMENT C3      90.0 - 180.0 mg/dL 77.8 (L)    COMPLEMENT C4      10.0 - 40.0 mg/dL 20.8       Legend:  (L) Low  ! 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  - RNP was also positive at [facility] but again likely falsely positive  - Was also found to have a + double-stranded DNA by crithidia  - Has been on Plaquenil alternating doses of 400 mg and 200 mg every other day.  Restarted 11/2020  - No kidney involvement  - seen by ophthalmology for Plaquenil exam 2/2023, Dr. Ashleigh Pinzon   - She was given a prednisone taper for some of her joint pain and took it recently which helped  - She will be prescribed meloxicam 7.5 mg once or twice a day for her pain.  - Blood work today and every 6 mos       Sicca symptoms, likely Sjogren's overlap     Urinary urgency- improved  - Has had recurrent urine infections.  Has a lot of urgency at times.       Pt will f/u in 6 mos      Beth Mercedes MD  8/11/2023  9:40 AM

## 2024-04-13 DIAGNOSIS — M32.9 SYSTEMIC LUPUS ERYTHEMATOSUS, UNSPECIFIED SLE TYPE, UNSPECIFIED ORGAN INVOLVEMENT STATUS (HCC): ICD-10-CM

## 2024-04-13 DIAGNOSIS — M35.1 OVERLAP SYNDROME (HCC): ICD-10-CM

## 2024-04-13 NOTE — TELEPHONE ENCOUNTER
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  - RNP was also positive at [facility] but again likely falsely positive  - Was also found to have a + double-stranded DNA by crithidia  - Has been on Plaquenil alternating doses of 400 mg and 200 mg every other day.  Restarted 11/2020  - No kidney involvement  - seen by ophthalmology for Plaquenil exam 2/2023, Dr. Ashleigh Pinzon   - She was given a prednisone taper for some of her joint pain and took it recently which helped  - She will be prescribed meloxicam 7.5 mg once or twice a day for her pain.  - Blood work today and every 6 mos      Sicca symptoms, likely Sjogren's overlap     Urinary urgency- improved  - Has had recurrent urine infections.  Has a lot of urgency at times.       Pt will f/u in 6 mos      Beth Mercedes MD  8/11/2023  9:40 AM        Please advise.

## 2024-04-15 RX ORDER — HYDROXYCHLOROQUINE SULFATE 200 MG/1
TABLET, FILM COATED ORAL
Qty: 135 TABLET | Refills: 1 | Status: SHIPPED | OUTPATIENT
Start: 2024-04-15

## 2024-05-08 ENCOUNTER — TELEPHONE (OUTPATIENT)
Dept: RHEUMATOLOGY | Facility: CLINIC | Age: 24
End: 2024-05-08

## 2024-05-08 NOTE — TELEPHONE ENCOUNTER
Patient calling to state has questions about prednisone, asking for call back from nurse, asking for call today if possible.

## 2024-06-17 ENCOUNTER — OFFICE VISIT (OUTPATIENT)
Dept: RHEUMATOLOGY | Facility: CLINIC | Age: 24
End: 2024-06-17
Payer: COMMERCIAL

## 2024-06-17 ENCOUNTER — LAB ENCOUNTER (OUTPATIENT)
Dept: LAB | Facility: HOSPITAL | Age: 24
End: 2024-06-17
Attending: INTERNAL MEDICINE

## 2024-06-17 VITALS
WEIGHT: 129 LBS | HEART RATE: 102 BPM | HEIGHT: 64.25 IN | DIASTOLIC BLOOD PRESSURE: 79 MMHG | SYSTOLIC BLOOD PRESSURE: 123 MMHG | BODY MASS INDEX: 22.02 KG/M2

## 2024-06-17 DIAGNOSIS — M32.9 SYSTEMIC LUPUS ERYTHEMATOSUS, UNSPECIFIED SLE TYPE, UNSPECIFIED ORGAN INVOLVEMENT STATUS (HCC): ICD-10-CM

## 2024-06-17 DIAGNOSIS — Z51.81 ENCOUNTER FOR MEDICATION MONITORING: ICD-10-CM

## 2024-06-17 DIAGNOSIS — M35.1 OVERLAP SYNDROME (HCC): ICD-10-CM

## 2024-06-17 DIAGNOSIS — M32.9 SYSTEMIC LUPUS ERYTHEMATOSUS, UNSPECIFIED SLE TYPE, UNSPECIFIED ORGAN INVOLVEMENT STATUS (HCC): Primary | ICD-10-CM

## 2024-06-17 LAB
ALBUMIN SERPL-MCNC: 4.8 G/DL (ref 3.2–4.8)
ALT SERPL-CCNC: 12 U/L
AST SERPL-CCNC: 16 U/L (ref ?–34)
BASOPHILS # BLD AUTO: 0.06 X10(3) UL (ref 0–0.2)
BASOPHILS NFR BLD AUTO: 1.4 %
BILIRUB UR QL: NEGATIVE
C3 SERPL-MCNC: 81.1 MG/DL (ref 82–160)
C4 SERPL-MCNC: 18.5 MG/DL (ref 12–36)
CLARITY UR: CLEAR
COLOR UR: YELLOW
CREAT BLD-MCNC: 0.86 MG/DL
CRP SERPL-MCNC: <0.4 MG/DL (ref ?–1)
DEPRECATED RDW RBC AUTO: 39.4 FL (ref 35.1–46.3)
EGFRCR SERPLBLD CKD-EPI 2021: 97 ML/MIN/1.73M2 (ref 60–?)
EOSINOPHIL # BLD AUTO: 0.03 X10(3) UL (ref 0–0.7)
EOSINOPHIL NFR BLD AUTO: 0.7 %
ERYTHROCYTE [DISTWIDTH] IN BLOOD BY AUTOMATED COUNT: 12.2 % (ref 11–15)
ERYTHROCYTE [SEDIMENTATION RATE] IN BLOOD: 1 MM/HR
GLUCOSE UR-MCNC: NORMAL MG/DL
HCT VFR BLD AUTO: 40.9 %
HGB BLD-MCNC: 13.7 G/DL
HGB UR QL STRIP.AUTO: NEGATIVE
IMM GRANULOCYTES # BLD AUTO: 0.01 X10(3) UL (ref 0–1)
IMM GRANULOCYTES NFR BLD: 0.2 %
KETONES UR-MCNC: NEGATIVE MG/DL
LEUKOCYTE ESTERASE UR QL STRIP.AUTO: 250
LYMPHOCYTES # BLD AUTO: 1.55 X10(3) UL (ref 1–4)
LYMPHOCYTES NFR BLD AUTO: 36 %
MCH RBC QN AUTO: 29.6 PG (ref 26–34)
MCHC RBC AUTO-ENTMCNC: 33.5 G/DL (ref 31–37)
MCV RBC AUTO: 88.3 FL
MONOCYTES # BLD AUTO: 0.48 X10(3) UL (ref 0.1–1)
MONOCYTES NFR BLD AUTO: 11.2 %
NEUTROPHILS # BLD AUTO: 2.17 X10 (3) UL (ref 1.5–7.7)
NEUTROPHILS # BLD AUTO: 2.17 X10(3) UL (ref 1.5–7.7)
NEUTROPHILS NFR BLD AUTO: 50.5 %
NITRITE UR QL STRIP.AUTO: NEGATIVE
PH UR: 6 [PH] (ref 5–8)
PLATELET # BLD AUTO: 241 10(3)UL (ref 150–450)
PROT UR-MCNC: 20 MG/DL
RBC # BLD AUTO: 4.63 X10(6)UL
SP GR UR STRIP: 1.02 (ref 1–1.03)
UROBILINOGEN UR STRIP-ACNC: NORMAL
WBC # BLD AUTO: 4.3 X10(3) UL (ref 4–11)

## 2024-06-17 PROCEDURE — 85652 RBC SED RATE AUTOMATED: CPT

## 2024-06-17 PROCEDURE — 84450 TRANSFERASE (AST) (SGOT): CPT

## 2024-06-17 PROCEDURE — 36415 COLL VENOUS BLD VENIPUNCTURE: CPT

## 2024-06-17 PROCEDURE — 86160 COMPLEMENT ANTIGEN: CPT

## 2024-06-17 PROCEDURE — 82040 ASSAY OF SERUM ALBUMIN: CPT

## 2024-06-17 PROCEDURE — 81001 URINALYSIS AUTO W/SCOPE: CPT

## 2024-06-17 PROCEDURE — 86140 C-REACTIVE PROTEIN: CPT

## 2024-06-17 PROCEDURE — 85025 COMPLETE CBC W/AUTO DIFF WBC: CPT

## 2024-06-17 PROCEDURE — 99214 OFFICE O/P EST MOD 30 MIN: CPT | Performed by: INTERNAL MEDICINE

## 2024-06-17 PROCEDURE — 84460 ALANINE AMINO (ALT) (SGPT): CPT

## 2024-06-17 PROCEDURE — 86225 DNA ANTIBODY NATIVE: CPT

## 2024-06-17 PROCEDURE — G2211 COMPLEX E/M VISIT ADD ON: HCPCS | Performed by: INTERNAL MEDICINE

## 2024-06-17 PROCEDURE — 82565 ASSAY OF CREATININE: CPT

## 2024-06-17 RX ORDER — PREDNISONE 10 MG/1
TABLET ORAL
Qty: 20 TABLET | Refills: 0 | Status: SHIPPED | OUTPATIENT
Start: 2024-06-17

## 2024-06-17 NOTE — PROGRESS NOTES
Lakisha Scott is a 23 year old female.    HPI:     Chief Complaint   Patient presents with    SLE       I had the pleasure of seeing Lakisha Scott on 6/17/2024 for follow up SLE/Sjogren's      Current medications:   mg MWF and other days 200 mg daily- since Nov 2020  Blood work:  Neg RF, [facility] (2021)  2020 and 2016:  +DION,+RNP>8, +SSA>8, SSB>8  2020: +dsDNA 1:20  2007: +DION 1:1280 speckled, +SSA, SSB    Interval History:  This is a 20 yo F with hx of Bipolar disorder presents to establish care for lupus.  She was initially diagnosed when she was 6 at that time had a butterfly rash.  Never had any biopsy.  She was placed on hydroxychloroquine at that time.  She would have medication when she was 8 and was in remission.  In August 2020 she started to feel fatigue with joint and muscle pain all over.  She was following with rheumatologist Dr. Rita Soriano.  She was placed on prednisone for about 10 days which helped her pain.  She has been maintained on Plaquenil 400 mg Monday Wednesday Friday and 200 mg the other days.  Overall she was doing well on this regimen.  In February 2022 she developed appendicitis and since then has been having worsening pain all over.  Her joints ache and feel very uncomfortable.  Her joints feel like they are burning.  No redness or swelling noted.  Denies any oral ulcers, lymphadenopathy, hair loss, serositis, DVT or PE, RP or miscarriages.  Reports dry eyes and dry mouth but not significant.  Last eye exam was in November 2021 8/4/2022:   Presents for follow-up of SLE  During last visit she mentioned worsening joint pain around her thighs and her knees.  They would feel like a burning sensation but no redness or swelling  She was given some prednisone during her last visit and it helped with her symptoms.  She is doing better overall  She has acne and is seeing a dermatologist.  She was given retinol but noticed that her skin will become more red.  She also  noticed that the sun to make it worse    9/8/2022:   Presents for follow-up of SLE  She had a UTI 2 weeks ago and was on antibiotics, then she developed a significant headache and neck pain.  She was advised to go to the ED.  She was at college at that time  She ended up in the ED and they did an LP which showed total nucleated cell count of 13.  Continues to have a headache.  No fevers.  Feeling very tired and taking several naps  Continues to have hip and knee pain which is chronic.  No other active symptoms of lupus  Blood work was done in the ED showing normal ESR and CRP  She continues to have urinary urgency issues    11/21/2022:   Presents for follow-up of SLE  Also being treated for Migraines now on sumatriptan as needed   On HCQ alternating doses 400/200 mg every other day  Having a lot of fatigue and joint pain. Fatigue is worse   Had thyroid test and normal  Joint pain is also worse, mostly in hips and knees  Some pain in hands, not in elbows or shoulders  No new rashes  Also has low appetite and gets nauseous easily  Also having urinary urgency    3/15/2023:   Presents for follow-up of SLE/Sjogren's   On HCQ. Has some joint pain and worse with weather changes. Not debilitating. mostly in hips and knees  Also has some pain in hands ahmet with weather changes. Able to make fist  Still having recurrent UTI, will be seeing urology   Also getting more sores in the mouth and mucoseal, not painful  Was given steroids for her pain but never took it   Feels subjective fevers, uses ice packs almost daily    8/11/2023:   Presents for follow-up of SLE/Sjogren's   On HCQ  In May had a lot of fatigue and pain all over and had to medrol dose pack, it did help the symptoms  Having some pain in the joints ahmet with weather changes. Still functional. Mostly in the knees, hips and hands. No swelling  In June felt very weak, felt different. Has been under a lot of stress lately   No rashes, sores in mouth  Eyes have been very  dry, uses otc eye drops  UTIs has been better    6/17/2024:   Presents for follow-up of SLE/Sjogren's   On HCQ  Has been having more pain and more a fatigue  She took medrol gaxiola pack early May didn't help as much, she does better on prednisone   Joints are doing better now, more active now  No swelling in the joints  No rashes, has had a few sores in the mouth  No lymphadenopathy  Has not been having as much urinary tract infections  Meloxicam does not help her pain        HISTORY:  Past Medical History:    Bipolar depression (HCC)    Depression    as sophomore     Headache    random HA an neck pain due to stress    IUD (intrauterine device) in place    Kyleena    Lupus (HCC)    no current symptoms, Dx at age of 7. In remission since 11 years of age.      Other forms of systemic lupus erythematosus (HCC)      Social Hx Reviewed   Family Hx Reviewed     Medications (Active prior to today's visit):  Current Outpatient Medications   Medication Sig Dispense Refill    hydroxychloroquine 200 MG Oral Tab TAKE 2 TABLETS BY MOUTH MONDAY, WEDNESDAY, FRIDAY. TAKE 1 TABLET BY MOUTH TUESDAY, THURSDAY, SATURDAY, SUNDAY 135 tablet 1    clindamycin 1 % External Lotion APPLY THIN LAYER TOPICALLY TO FACE EVERY DAY      QUEtiapine ER 50 MG Oral Tablet 24 Hr Take 2 tablets (100 mg total) by mouth nightly. TAKE AT BEDTIME PRN      D-Mannose Does not apply Powder Take by mouth.      Azelaic Acid 15 % External Gel APPLY THIN LAYER TOPICALLY TO FACE EVERY DAY      Clindamycin Phosphate 1 % External Gel Apply 1 Application topically every morning.      lamoTRIgine 200 MG Oral Tab Take 1 tablet (200 mg total) by mouth 2 (two) times daily.      Adapalene-Benzoyl Peroxide 0.3-2.5 % External Gel Apply topically daily.      Clobetasol Propionate 0.05 % External Solution Apply to affected areas of the scalp daily x up to 14 days in a row as needed 25 mL 0    ketoconazole 2 % External Shampoo Apply to the scalp every other day. Leave on for 5  minutes before rinsing. 120 mL 5    Ciclopirox Olamine 0.77 % External Suspension Apply to affected areas of the scalp daily as needed 60 mL 5    LORazepam 0.5 MG Oral Tab Take 1 tablet (0.5 mg total) by mouth daily as needed for Anxiety.      buPROPion HCl ER, XL, 300 MG Oral Tablet 24 Hr TAKE 1 TABLET BY MOUTH ONCE DAILY IN THE MORNING      OLANZapine 2.5 MG Oral Tab Take 1 tablet (2.5 mg total) by mouth nightly. 60 tablet 0    Meloxicam 7.5 MG Oral Tab Take 1 tablet (7.5 mg total) by mouth in the morning and 1 tablet (7.5 mg total) before bedtime. (Patient not taking: Reported on 6/17/2024) 60 tablet 0    methylPREDNISolone 4 MG Oral Tablet Therapy Pack Take as directed on package. (Patient not taking: Reported on 6/17/2024) 1 each 0    SUMAtriptan Succinate 100 MG Oral Tab Apply 1 Application topically as needed. (Patient not taking: Reported on 6/17/2024)      QUEtiapine 25 MG Oral Tab Take 1 tablet (25 mg total) by mouth nightly. (Patient not taking: Reported on 6/17/2024)      levonorgestrel (KYLEENA) 19.5 MG Intrauterine IUD 19,500 mcg (1 each total) by Intrauterine route one time. (Patient not taking: Reported on 6/17/2024)      famotidine 40 MG Oral Tab Take 1 tablet (40 mg total) by mouth daily. (Patient not taking: Reported on 6/17/2024) 90 tablet 2    lamoTRIgine 100 MG Oral Tab Take 1 tablet (100 mg total) by mouth 2 (two) times daily. (Patient not taking: Reported on 6/17/2024)  0     .cmed  Allergies:  No Known Allergies      ROS:   All other ROS are negative.     PHYSICAL EXAM:   GEN: AAOx3, NAD  HEENT: EOMI, PERRLA, no injection or icterus, oral mucosa moist, no oral lesions. No lymphadenopathy. No facial rash  CVS: RRR, no murmurs rubs or gallops. Equal 2+ distal pulses.   LUNGS: CTAB, no increased work of breathing  ABDOMEN:  soft NT/ND, +BS, no HSM  SKIN: No rashes or skin lesions. No nail findings  MSK:  Cervical spine: FROM  Hands: no synovitis in DIP, PIP and MCP, strong full fists  Wrist:  FROM, no pain or swelling or warmth on palpation  Elbow: FROM, no pain or swelling or warmth on palpation  Shoulders: FROM, no pain or swelling or warmth on palpation  Hip: normal log roll, no lateral hip pain, DAPHNIE test negative b/l  Knees: FROM, no warmth or effusion present. No pain with ROM.   Ankles: FROM, no pain or swelling or warmth on palpation  Feet: no pain with MTP squeeze, no toe swelling or pain or warmth on palpation with FROM  Spine: no lumbar or sacral pain on palpation.  NEURO: Cranial nerves II-XII intact grossly. 5/5 strength throughout in both upper and lower extremities, sensation intact.  PSYCH: normal mood       LABS:       Component      Latest Ref Rng & Units 11/6/2020 10/6/2020 1/30/2016   Anti-dsDNA      0 - 4 IU/mL   1     Anti-Sm      0.0-<1.0 AI   0.4     Anti-SmRNP      0.0-<1.0 AI   1.4 (H)     Anti-SS-A (Ro)      0.0-<1.0 AI   >8.0 (H)     Anti-SS-B (La)      0.0-<1.0 AI   >8.0 (H)     Anti-Centromere B      0.0-<1.0 AI   <0.2     Anti-Scl-70      0.0-<1.0 AI   <0.2     Anti-Chromatin      0.0-<1.0 AI   <0.2     Anti-Ribosomal P      0.0-<1.0 AI   <0.2     Anti-RNP      0.0-<1.0 AI   >8.0 (H)     Anti-MICKI-1      0.0-<1.0 AI   <0.2     SSA AUTOAB      <100 AU/mL     391 (H)   SSB AUTOAB      <100 AU/mL     486 (H)   Sm AUTOAB ([place])      0 - 99 AU/mL         RNP AUTOAB      <100 AU/mL     144 (H)   Scl-70 AUTOAB      0 - 99 AU/mL         MICKI-1 AUTOAB      0 - 99 AU/mL         dsDNA AUTOAB      0 - 99 IU/mL         CENTROMERE AUTOAB      0 - 99 AU/mL         HISTONE AUTOAB      0 - 99 AU/mL         DION PROFILE      NEGATIVE         COMPLEMENT C4      14.0 - 28.0 mg/dL 24.0       COMPLEMENT C3      51.0 - 160.0 mg/dl 117.0       DION SCREEN      Negative   Positive (A) Positive (A)   dsDNA Crithidia luciliae IFA      Negative Positive (A)       dsDNA Crithidia luciliae Titer      Neg<1:10 1:20 (H)          Component      Latest Ref Rng & Units 9/12/2011   Anti-dsDNA      0 - 4 IU/mL      Anti-Sm      0.0-<1.0 AI     Anti-SmRNP      0.0-<1.0 AI     Anti-SS-A (Ro)      0.0-<1.0 AI     Anti-SS-B (La)      0.0-<1.0 AI     Anti-Centromere B      0.0-<1.0 AI     Anti-Scl-70      0.0-<1.0 AI     Anti-Chromatin      0.0-<1.0 AI     Anti-Ribosomal P      0.0-<1.0 AI     Anti-RNP      0.0-<1.0 AI     Anti-MICKI-1      0.0-<1.0 AI     SSA AUTOAB      <100 AU/mL 617 (H)   SSB AUTOAB      <100 AU/mL 333 (H)   Sm AUTOAB ([place])      0 - 99 AU/mL <100   RNP AUTOAB      <100 AU/mL <100   Scl-70 AUTOAB      0 - 99 AU/mL <100   MICKI-1 AUTOAB      0 - 99 AU/mL <100   dsDNA AUTOAB      0 - 99 IU/mL <100   CENTROMERE AUTOAB      0 - 99 AU/mL <100   HISTONE AUTOAB      0 - 99 AU/mL <100   DION PROFILE      NEGATIVE POSITIVE (H)   COMPLEMENT C4      14.0 - 28.0 mg/dL 21.6   COMPLEMENT C3      51.0 - 160.0 mg/dl 104.0   DION SCREEN      Negative     dsDNA Crithidia luciliae IFA      Negative     dsDNA Crithidia luciliae Titer      Neg<1:10        Component      Latest Ref Rng & Units 11/24/2021 8/11/2021 11/6/2020   Cyclic Citrullinated Peptides IgG Quantitative      <3.0 U/mL     0.5   Cyclic Citrullinated Peptides IgG Qualitative      Negative     Negative   dsDNA Crithidia luciliae IFA      Negative Negative   Positive (A)   COMPLEMENT C3      51.0 - 160.0 mg/dl 105.0 104.0 117.0   COMPLEMENT C4      14.0 - 28.0 mg/dL 27.0 22.0 24.0   RHEUMATOID FACTOR      <=14 IU/mL     <10   Anti-dsDNA      0 - 4 IU/mL   2     SED RATE      0 - 20 mm/hr   5     C-Reactive Protein      <=0.50 mg/dL   <0.30        Imaging:     [id number]    ASSESSMENT/PLAN:     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  - RNP was also positive at [facility] but again likely falsely positive  - Was also found to have a + double-stranded DNA by  crithidia  - Has been on Plaquenil alternating doses of 400 mg and 200 mg every other day.  Restarted 11/2020  - No kidney involvement  - seen by ophthalmology for Plaquenil exam 2/2024, Dr. Ashleigh Pinzon   - She was given a prednisone taper for some of her joint pain and took it recently which helped, refill given   - Blood work today and every 6 mos      Sicca symptoms, likely Sjogren's overlap     Urinary urgency- improved  - Has had recurrent urine infections.  Has a lot of urgency at times.      Pt will f/u in 6 mos     There is a longitudinal care relationship with me, the care plan reflects the ongoing nature of the continuous relationship of care, and the medical record indicates that there is ongoing treatment of a serious/complex medical condition which I am currently managing.  is Applicable.     Beth Mercedes MD  6/17/2024  11:26 AM

## 2024-06-18 LAB — DSDNA AB TITR SER: <10 {TITER}

## 2024-08-29 ENCOUNTER — LAB ENCOUNTER (OUTPATIENT)
Dept: LAB | Age: 24
End: 2024-08-29
Attending: PHYSICIAN ASSISTANT
Payer: COMMERCIAL

## 2024-08-29 DIAGNOSIS — R35.0 URINARY FREQUENCY: ICD-10-CM

## 2024-08-29 PROCEDURE — 87086 URINE CULTURE/COLONY COUNT: CPT

## 2024-09-30 ENCOUNTER — OFFICE VISIT (OUTPATIENT)
Facility: LOCATION | Age: 24
End: 2024-09-30
Payer: COMMERCIAL

## 2024-09-30 DIAGNOSIS — R10.819 LOWER ABDOMINAL TENDERNESS: ICD-10-CM

## 2024-09-30 DIAGNOSIS — R39.89 SENSATION OF PRESSURE IN BLADDER AREA: Primary | ICD-10-CM

## 2024-09-30 DIAGNOSIS — R39.14 FEELING OF INCOMPLETE BLADDER EMPTYING: ICD-10-CM

## 2024-09-30 DIAGNOSIS — R39.15 URINARY URGENCY: ICD-10-CM

## 2024-09-30 LAB
APPEARANCE: CLEAR
BILIRUBIN: NEGATIVE
GLUCOSE (URINE DIPSTICK): NEGATIVE MG/DL
KETONES (URINE DIPSTICK): NEGATIVE MG/DL
LEUKOCYTES: NEGATIVE
MULTISTIX LOT#: NORMAL NUMERIC
NITRITE, URINE: NEGATIVE
OCCULT BLOOD: NEGATIVE
PH, URINE: 6.5 (ref 4.5–8)
PROTEIN (URINE DIPSTICK): NEGATIVE MG/DL
SPECIFIC GRAVITY: 1.01 (ref 1–1.03)
URINE-COLOR: YELLOW
UROBILINOGEN,SEMI-QN: 0.2 MG/DL (ref 0–1.9)

## 2024-09-30 PROCEDURE — 99213 OFFICE O/P EST LOW 20 MIN: CPT | Performed by: PHYSICIAN ASSISTANT

## 2024-09-30 PROCEDURE — 81003 URINALYSIS AUTO W/O SCOPE: CPT | Performed by: PHYSICIAN ASSISTANT

## 2024-09-30 NOTE — PATIENT INSTRUCTIONS
Cystoscopy    Cystoscopy is a test that allows your doctor to look at the inside of the bladder and the urethra using a thin, lighted instrument called a cystoscope.  The cystoscope is inserted into your urethra and slowly advanced into the bladder. Cystoscopy allows your doctor to look at areas of your bladder and urethra that usually do not show up well on X-rays. Tiny surgical instruments can be inserted through the cystoscope that allow your doctor to remove samples of tissue (biopsy) or samples of urine.  Small bladder stones and some small growths can be removed during cystoscopy. This may eliminate the need for more extensive surgery.     Why It Is Done  Cystoscopy may be done to:  Find the cause of symptoms such as blood in the urine (hematuria), painful urination (dysuria), urinary incontinence, urinary frequency or hesitancy, an inability to pass urine (retention), or a sudden and overwhelming need to urinate (urgency).   Find the cause of problems of the urinary tract, such as frequent, repeated urinary tract infections or urinary tract infections that do not respond to treatment.   Look for problems in the urinary tract, such as blockage in the urethra caused by an enlarged prostate, kidney stones, or tumors.   Evaluate problems that cannot be seen on X-ray or to further investigate problems detected by ultrasound or during intravenous pyelography, such as kidney stones or tumors.   Remove tissue samples for biopsy.   Remove foreign objects.   Treat urinary tract problems. For example, cystoscopy can be done to remove urinary tract stones or growths, treat bleeding in the bladder, relieve blockages in the urethra, or treat or remove tumors.   How To Prepare  You may eat and drink normally before the procedure. You may be asked to give a urine sample at the time of your procedure. Please do not urinate before.    How It Is Done  Cystoscopy is performed by a urologist, with one or more assistants. The test  is done in a special testing room in the doctor's office.  You will need undress from the waste down, and you will be given a cloth or paper covering to use during the test. You will lie on your back on a special table. Females will have their knees bent and feet placed in stirrups. Males will lay flat on the table, legs straight. Your genital area is cleaned with an antiseptic solution, and your abdomen and thighs are covered with sterile cloths. A local anesthetic jelly will be inserted into the urethra. No needles are used.   After the anesthetic takes effect, a well-lubricated cystoscope is inserted into your urethra and slowly advanced into your bladder. If your urethra has a spot that is too narrow to allow the scope to pass, other smaller instruments are inserted first to gradually enlarge the opening.  After the cystoscope is inside your bladder, either sterile water or saline is injected through the scope to help expand your bladder and to create a clear view. Tiny instruments may be inserted through the scope to collect tissue samples for biopsy if necessary; the tissue samples then are sent to the laboratory for analysis.  The cystoscope is usually in your bladder for only 2 to 10 minutes. But the entire test may take up to 30 minutes or longer.  You will be able to get up immediately following the procedure and proceed with normal daily activities.     How It Feels  Most people report that this test is not nearly as uncomfortable as they had expected.   When local anesthetic is used, you may feel a burning sensation or an urge to urinate when the instrument is inserted and removed. Also, when your bladder is irrigated with sterile water or saline, you may feel a cool sensation, an uncomfortable fullness, and an urgent need to urinate. Try to relax during the test by taking slow, deep breaths. Also, if the test is lengthy, lying on the table can become tiring and uncomfortable.     After the test  After  the test, you may need to urinate frequently, with some burning during and after urination for a day or two. Drink lots of fluids to help minimize the burning and to prevent a urinary tract infection. You may also see a tinge of blood in the urine for 2-3 days.    You will be given an instruction sheet following your cystoscopy with post procedure instructions.     Results  Cystoscopy is a test that allows the doctor to look at the inside of the bladder and the urethra. Your doctor may be able to talk to you about some of the results right after the cystoscopy. If a biopsy is preformed, the results usually take several days to become available. You will be called promptly with results.   Thank you for the pleasure of allowing us to be involved in your care.    Urinary tract infections can affect your general health and your quality of life.  Some UTI’s can be prevented.  Here are some suggestions that my help prevent frequent UTI’s.     Good Hygiene: Always wipe front to back.  Don’t use perfumed soaps.  Plain soaps like Ivory are the best.  Don’t use washcloths.  Place soap directly on your hands and clean the genital area.  Rinse thoroughly.  Soap can be very irritating to the vaginal mucosa.     Urination: Urinate every 2-3 hours during the day.  Empty your bladder just before going to bed and  first thing when you wake up.  Make sure you go to the bathroom when you have a strong urge. Don't hover over the toilet to urinate.  The bladder may not be completely emptying when using this technique.  Sometimes you may need to \"double-void\".  After you finish urinating, stand up, then sit back down to urinate again.     Clothing: Wear cotton underwear. Change daily.  Avoid tight jeans.       [place]: Sometimes UTI’s are related to intercourse.  Wash genital area before and rinse afterwards.  Empty your bladder before and after intercourse.  Use of vaginal lubricants may be helpful.  Condoms and some lubricants may  be irritating to the vaginal mucosa.  Spermicide should be avoided.  Talk to your doctor, you may require a suppressive antibiotic to take after intercourse.     Supplements: Take Vitamin C 500 or 1000mg daily.  Cranberry pills 400mg daily or if symptomatic 400mg two times per day.  Eat yogurt or consider taking a probiotic.  D-mannose 1 gram is another supplement that can help prevent infections.  This one is harder to find, but can be found at stores such as [employer], [employer], or a specialized vitamin store.  Fluid intake should be at least 48oz daily with the goal of 64oz daily.  Water is preferable.      Constipation: Constipation has been linked to UTI’s.  You should be having a soft BM daily.  Dietary fiber should be between 20-25 grams daily.  Flaxseed, All-Bran cereal, Fiber One bars, fruits and vegetables are a good sources of fiber.  Alternatively, Metamucil can be used daily.  Gentle laxatives and stool softeners may be added on a daily or as needed basis if necessary (Miralax, Colace, Pericolace).      Vaginal creams and moisturizers: It may be recommended you try vaginal creams, moisturizers or lubricants as a prevention method.  Over-the-Counter products:  Replens:  1 applicator three times per week  Luvena prebiotic vaginal moisturizer and lubricant:  package of 6.  1 tube every three days at bedtime.  Helps restore pH balance, decrease vaginal dryness, odor and itchiness.  KY liquibeads  KY silk - moisture enhancer  MeAgain Vaginal Moisturizer.  8 per package.  Use 1 daily for 7 days then 1 daily two times per week.  Astroglide  Prescriptions:  Estrace Cream  Premarin Cream  E-string     Prescription medications: Your doctor may recommend daily or as needed prescription medications including antibiotics to prevent urinary tract infection as well.

## 2024-09-30 NOTE — PROGRESS NOTES
Subjective:   Patient is a 24 year old female with hx of bipolar depression, SLE/Sjogren's who presents today for follow up chronic cystitis.    Last seen in September 2023 for follow up same. RBUS had been ordered but not completed. VIKOR testing <10K alloscardovia omnicolens. Repeat culture in December 2023 <10K mixed gram positive nafisa. Symptoms resolved.    Overall symptoms had improved and she was satisfied with urinary habits, but end of August 2024 developed increased urinary urgency. Urine culture 50-100K mixed nafisa. She continues to experience urinary urgency, urinary frequency, pinching sensation of incomplete emptying, bladder pressure. No associated dysuria or gross hematuria. No lateralization of pressure. No hx of stones. Resumed D Mannose in May 2024, not currently taking probiotic.     Was seen for consult in March 2023. She was started on D Mannose and we reviewed importance of her anxiety management. Anxiety seems better.      Did complete PFT x 2 sessions with improvement in 2023  Fluids 64+oz of water.   Bowels: regular, prior constipation.   LMP 9/15, ended 9/20     History/Other:    Chief Complaint Reviewed and Verified  Nursing Notes Reviewed and   Verified  Allergies Reviewed  Medications Reviewed         Current Outpatient Medications   Medication Sig Dispense Refill    predniSONE 10 MG Oral Tab 30 mg daily x3 days then 20 mg daily x3 days then 10 mg daily x3 days then stop 20 tablet 0    hydroxychloroquine 200 MG Oral Tab TAKE 2 TABLETS BY MOUTH MONDAY, WEDNESDAY, FRIDAY. TAKE 1 TABLET BY MOUTH TUESDAY, THURSDAY, SATURDAY, SUNDAY 135 tablet 1    clindamycin 1 % External Lotion APPLY THIN LAYER TOPICALLY TO FACE EVERY DAY      QUEtiapine ER 50 MG Oral Tablet 24 Hr Take 2 tablets (100 mg total) by mouth nightly. TAKE AT BEDTIME PRN      D-Mannose Does not apply Powder Take by mouth.      Azelaic Acid 15 % External Gel APPLY THIN LAYER TOPICALLY TO FACE EVERY DAY      SUMAtriptan  Succinate 100 MG Oral Tab Apply 1 Application topically as needed. (Patient not taking: Reported on 6/17/2024)      Clindamycin Phosphate 1 % External Gel Apply 1 Application topically every morning.      lamoTRIgine 200 MG Oral Tab Take 1 tablet (200 mg total) by mouth 2 (two) times daily.      Adapalene-Benzoyl Peroxide 0.3-2.5 % External Gel Apply topically daily.      Clobetasol Propionate 0.05 % External Solution Apply to affected areas of the scalp daily x up to 14 days in a row as needed 25 mL 0    ketoconazole 2 % External Shampoo Apply to the scalp every other day. Leave on for 5 minutes before rinsing. 120 mL 5    QUEtiapine 25 MG Oral Tab Take 1 tablet (25 mg total) by mouth nightly. (Patient not taking: Reported on 6/17/2024)      levonorgestrel (KYLEENA) 19.5 MG Intrauterine IUD 19,500 mcg (1 each total) by Intrauterine route one time. (Patient not taking: Reported on 6/17/2024)      famotidine 40 MG Oral Tab Take 1 tablet (40 mg total) by mouth daily. (Patient not taking: Reported on 6/17/2024) 90 tablet 2    Ciclopirox Olamine 0.77 % External Suspension Apply to affected areas of the scalp daily as needed 60 mL 5    LORazepam 0.5 MG Oral Tab Take 1 tablet (0.5 mg total) by mouth daily as needed for Anxiety.      buPROPion HCl ER, XL, 300 MG Oral Tablet 24 Hr TAKE 1 TABLET BY MOUTH ONCE DAILY IN THE MORNING      OLANZapine 2.5 MG Oral Tab Take 1 tablet (2.5 mg total) by mouth nightly. 60 tablet 0    lamoTRIgine 100 MG Oral Tab Take 1 tablet (100 mg total) by mouth 2 (two) times daily. (Patient not taking: Reported on 6/17/2024)  0       Review of Systems:  Pertinent items are noted in HPI.      Objective:   [facility] 12/09/2023 (Exact Date)  Estimated body mass index is 21.97 kg/m² as calculated from the following:    Height as of 6/17/24: 5' 4.25\" (1.632 m).    Weight as of 6/17/24: 129 lb (58.5 kg).    GENERAL APPEARANCE: a well-developed, well-nourished, in no apparent distress. A&O x 3  ABDOMEN: soft,  bilateral lower abdominal discomfort  CVA: no CVA tenderness      Laboratory Data:  Lab Results   Component Value Date    WBC 4.3 06/17/2024    HGB 13.7 06/17/2024    .0 06/17/2024     Lab Results   Component Value Date     08/11/2023    K 3.5 08/11/2023     08/11/2023    CO2 27.0 08/11/2023    BUN 8 08/11/2023    GLU 81 08/11/2023    GFRAA 148 05/12/2019    AST 16 06/17/2024    ALT 12 06/17/2024    TP 7.1 08/11/2023    ALB 4.8 06/17/2024    CA 8.8 08/11/2023       Urinalysis Results (last three years):  Recent Labs     11/22/21  1838 11/22/21  1846 12/21/21  1458 02/28/22  0857 08/04/22  1608 09/08/22  1120 11/22/22  1551 03/15/23  1541 03/16/23  1647 08/11/23  1012 09/11/23  1359 12/22/23  1552 06/17/24  1157 09/30/24  1520   COLORUR  --   --   --   --  Yellow Yellow Yellow Yellow  --  Light-Yellow  --  Colorless* Yellow  --    CLARITY  --   --   --   --  Clear Clear Clear Clear  --  Clear  --  Clear Clear  --    SPECGRAVITY 1.025  --  1.006 1.015 1.015 1.010 <=1.005 1.011 1.010 1.010 1.005 1.009 1.023 1.010   PHURINE 7.0  --   --  6.0 7.5 6.5 6.0 6.0 7.0 5.5 6.0 6.5 6.0 6.5   PROUR  --   --  <4.0  --  Negative Negative Negative Negative  --  Trace*  --  Negative 20*  --    GLUUR  --   --   --   --  Negative Negative Negative Negative  --  Normal  --  Normal Normal  --    KETUR  --   --   --   --  Negative Negative Negative Negative  --  Negative  --  Negative Negative  --    BILUR  --   --   --   --  Negative Negative Negative Negative  --  Negative  --  Negative Negative  --    BLOODURINE  --   --   --   --  Negative Negative Trace-Intact* Small*  --  Negative  --  Negative Negative  --    NITRITE Negative  --  Negative Negative Negative Negative Negative Negative Negative Negative Negative Negative Negative Negative   UROBILINOGEN  --   --   --   --  0.2 0.2 0.2 <2.0  --  Normal  --  Normal Normal  --    LEUUR  --   --   --   --  Negative Trace* Negative Trace*  --  75*  --  Negative 250*  --     UASA  --   --   --   --   --   --   --  Negative  --   --   --   --   --   --    WBCUR  --  >20/HPF*  --   --   --  1-5  1-5 1-5 1-5  --  1-5  --   --  6-10*  --    RBCUR  --  0-2/HPF  --   --   --  0-2  0-2 None Seen 0-2  --  0-2  --   --  0-2  --    BACUR  --  3+* None seen  --   --  Rare*  Rare* Rare* 1+*  --  1+*  --   --  Rare*  --        Urine Culture Results (last three years):  Lab Results   Component Value Date    URINECUL  08/29/2024     ,000 cfu/ml Multiple species present- probable contamination.    URINECUL  12/22/2023     <10,000 cfu/ml Mixture of Gram positive organisms isolated - probable contamination.    URINECUL No Growth at 18-24 hrs. 11/22/2022       Imaging  No results found.    Assessment & Plan:   Recurrent cystitis  Lower abdominal tenderness  Urinary urgency    Reviewed with patient potential contributing factors  Will send urine for VIKOR PCR today, RBUS reordered to complete  Consideration for PFT, beta agonist (avoid anticholinergic d/t Sjogrens), vs cystoscopy. Pending results will make final recommendations.    Suyapa Choi PA-C, 9/30/2024

## 2024-10-02 ENCOUNTER — HOSPITAL ENCOUNTER (OUTPATIENT)
Dept: ULTRASOUND IMAGING | Age: 24
Discharge: HOME | End: 2024-10-02
Attending: PHYSICIAN ASSISTANT
Payer: COMMERCIAL

## 2024-10-02 DIAGNOSIS — R39.89 SENSATION OF PRESSURE IN BLADDER AREA: ICD-10-CM

## 2024-10-02 PROCEDURE — 76770 US EXAM ABDO BACK WALL COMP: CPT | Performed by: PHYSICIAN ASSISTANT

## 2024-10-03 ENCOUNTER — TELEPHONE (OUTPATIENT)
Dept: SURGERY | Facility: CLINIC | Age: 24
End: 2024-10-03

## 2024-10-09 ENCOUNTER — HOSPITAL ENCOUNTER (OUTPATIENT)
Age: 24
Discharge: HOME | End: 2024-10-09
Payer: COMMERCIAL

## 2024-10-09 VITALS
HEART RATE: 105 BPM | DIASTOLIC BLOOD PRESSURE: 80 MMHG | WEIGHT: 135 LBS | OXYGEN SATURATION: 100 % | HEIGHT: 64 IN | RESPIRATION RATE: 18 BRPM | BODY MASS INDEX: 23.05 KG/M2 | TEMPERATURE: 98 F | SYSTOLIC BLOOD PRESSURE: 139 MMHG

## 2024-10-09 DIAGNOSIS — N89.8 VAGINAL DISCHARGE: Primary | ICD-10-CM

## 2024-10-09 LAB
B-HCG UR QL: NEGATIVE
BILIRUB UR QL STRIP: NEGATIVE
CLARITY UR: CLEAR
COLOR UR: YELLOW
GLUCOSE UR STRIP-MCNC: NEGATIVE MG/DL
HGB UR QL STRIP: NEGATIVE
KETONES UR STRIP-MCNC: NEGATIVE MG/DL
LEUKOCYTE ESTERASE UR QL STRIP: NEGATIVE
NITRITE UR QL STRIP: NEGATIVE
PH UR STRIP: 6.5 [PH]
PROT UR STRIP-MCNC: NEGATIVE MG/DL
SP GR UR STRIP: 1.01
UROBILINOGEN UR STRIP-ACNC: <2 MG/DL

## 2024-10-09 PROCEDURE — 81025 URINE PREGNANCY TEST: CPT

## 2024-10-09 PROCEDURE — 81002 URINALYSIS NONAUTO W/O SCOPE: CPT

## 2024-10-09 PROCEDURE — 81514 NFCT DS BV&VAGINITIS DNA ALG: CPT | Performed by: NURSE PRACTITIONER

## 2024-10-09 PROCEDURE — 99214 OFFICE O/P EST MOD 30 MIN: CPT

## 2024-10-09 PROCEDURE — 99203 OFFICE O/P NEW LOW 30 MIN: CPT

## 2024-10-09 PROCEDURE — 87086 URINE CULTURE/COLONY COUNT: CPT | Performed by: NURSE PRACTITIONER

## 2024-10-10 LAB
BV BACTERIA DNA VAG QL NAA+PROBE: NEGATIVE
C GLABRATA DNA VAG QL NAA+PROBE: NEGATIVE
C KRUSEI DNA VAG QL NAA+PROBE: NEGATIVE
CANDIDA DNA VAG QL NAA+PROBE: NEGATIVE
T VAGINALIS DNA VAG QL NAA+PROBE: NEGATIVE

## 2024-10-10 NOTE — ED PROVIDER NOTES
Patient Seen in: [facility]      History     Chief Complaint   Patient presents with    Eval-G    UTI     Stated Complaint: UTI    Subjective:   HPI  24-year-old female who is currently on an antibiotic for UTI presents complaining of white clumpy vaginal discharge with continued UTI symptoms.  She denies any fever or chills.  She denies any flank pain.    Objective:     Past Medical History:    Bipolar depression (HCC)    Depression    as sophomore     Headache    random HA an neck pain due to stress    IUD (intrauterine device) in place    K[place]ena    Lupus    no current symptoms, Dx at age of 7. In remission since 11 years of age.      Other forms of systemic lupus erythematosus (HCC)              Past Surgical History:   Procedure Laterality Date    Laparoscopic appendectomy  02/18/2022    Other surgical history      Oral surgery/for preparation of braces                Social History     Socioeconomic History    Marital status: Single   Tobacco Use    Smoking status: Never    Smokeless tobacco: Never   Vaping Use    Vaping status: Never Used   Substance and Sexual Activity    Alcohol use: Not Currently    Drug use: No    Sexual activity: Not Currently     Birth control/protection: I.U.D.   Other Topics Concern    Caffeine Concern Yes    Exercise No    Seat Belt Yes              Review of Systems   All other systems reviewed and are negative.      Positive for stated complaint: UTI  Other systems are as noted in HPI.  Constitutional and vital signs reviewed.      All other systems reviewed and negative except as noted above.    Physical Exam     ED Triage Vitals [10/09/24 1909]   /80   Pulse 105   Resp 18   Temp 97.9 °F (36.6 °C)   Temp src Temporal   SpO2 100 %   O2 Device None (Room air)       Current Vitals:   Vital Signs  BP: 139/80  Pulse: 105  Resp: 18  Temp: 97.9 °F (36.6 °C)  Temp src: Temporal    Oxygen Therapy  SpO2: 100 %  O2 Device: None (Room air)        Physical Exam  Vitals  and nursing note reviewed.   Constitutional:       General: She is not in acute distress.     Appearance: She is well-developed. She is not ill-appearing or toxic-appearing.   Cardiovascular:      Rate and Rhythm: Normal rate.   Pulmonary:      Effort: Pulmonary effort is normal.   Abdominal:      Tenderness: There is no right CVA tenderness or left CVA tenderness.   Neurological:      Mental Status: She is alert and oriented to person, place, and time.             ED Course     Labs Reviewed   POCT PREGNANCY URINE - Normal   [facility] POCT URINALYSIS DIPSTICK   URINE CULTURE, ROUTINE   VAGINITIS VAGINOSIS PCR PANEL                   MDM     Medical Decision Making  24-year-old female who is currently on an antibiotic for UTI presents complaining of white clumpy vaginal discharge with continued UTI symptoms.  She denies any fever or chills.  She denies any flank pain.    Pertinent Labs & Imaging studies reviewed. (See chart for details).  Patient coming in with UTI symptoms, vaginal discharge.   Differential diagnosis includes but not limited to UTI, vaginitis, yeast  Labs reviewed pregnancy negative with normal urine dip.  Culture sent as well as vaginitis.  Will treat for vaginal discharge.  Will discharge on patient to start Monistat while vaginitis panel is pending. Patient/Parent is comfortable with this plan.    Patient most likely with yeast due to frequent antibiotic use as well as being on antibiotics currently.  Patient requested self swab vaginitis panel.  Patient instructed to start Monistat 7.  Urine culture pending.        Problems Addressed:  Vaginal discharge: acute illness or injury        Disposition and Plan     Clinical Impression:  1. Vaginal discharge         Disposition:  Discharge  10/9/2024  7:25 pm    Follow-up:  No follow-up provider specified.        Medications Prescribed:  Current Discharge Medication List              Supplementary Documentation:

## 2024-10-13 DIAGNOSIS — M35.1 OVERLAP SYNDROME (HCC): ICD-10-CM

## 2024-10-13 DIAGNOSIS — M32.9 SYSTEMIC LUPUS ERYTHEMATOSUS, UNSPECIFIED SLE TYPE, UNSPECIFIED ORGAN INVOLVEMENT STATUS (HCC): ICD-10-CM

## 2024-10-14 ENCOUNTER — OFFICE VISIT (OUTPATIENT)
Facility: LOCATION | Age: 24
End: 2024-10-14
Payer: COMMERCIAL

## 2024-10-14 DIAGNOSIS — R35.0 URINARY FREQUENCY: ICD-10-CM

## 2024-10-14 DIAGNOSIS — R39.15 URINARY URGENCY: ICD-10-CM

## 2024-10-14 DIAGNOSIS — N30.90 RECURRENT CYSTITIS: Primary | ICD-10-CM

## 2024-10-14 LAB
APPEARANCE: CLEAR
BILIRUBIN: NEGATIVE
GLUCOSE (URINE DIPSTICK): NEGATIVE MG/DL
KETONES (URINE DIPSTICK): NEGATIVE MG/DL
LEUKOCYTES: NEGATIVE
MULTISTIX LOT#: ABNORMAL NUMERIC
NITRITE, URINE: NEGATIVE
PH, URINE: 7 (ref 4.5–8)
PROTEIN (URINE DIPSTICK): NEGATIVE MG/DL
SPECIFIC GRAVITY: 1.01 (ref 1–1.03)
URINE-COLOR: YELLOW
UROBILINOGEN,SEMI-QN: 0.2 MG/DL (ref 0–1.9)

## 2024-10-14 PROCEDURE — 81003 URINALYSIS AUTO W/O SCOPE: CPT | Performed by: PHYSICIAN ASSISTANT

## 2024-10-14 PROCEDURE — 99213 OFFICE O/P EST LOW 20 MIN: CPT | Performed by: PHYSICIAN ASSISTANT

## 2024-10-14 RX ORDER — FESOTERODINE FUMARATE 8 MG/1
8 TABLET, FILM COATED, EXTENDED RELEASE ORAL DAILY
Qty: 30 TABLET | Refills: 5 | Status: SHIPPED | OUTPATIENT
Start: 2024-10-14

## 2024-10-14 RX ORDER — TRETINOIN 0.25 MG/G
CREAM TOPICAL
COMMUNITY
Start: 2024-10-02

## 2024-10-14 RX ORDER — HYDROXYCHLOROQUINE SULFATE 200 MG/1
TABLET, FILM COATED ORAL
Qty: 135 TABLET | Refills: 1 | Status: SHIPPED | OUTPATIENT
Start: 2024-10-14

## 2024-10-14 NOTE — TELEPHONE ENCOUNTER
Requested Prescriptions     Pending Prescriptions Disp Refills    hydroxychloroquine 200 MG Oral Tab [Pharmacy Med Name: HYDROXYCHLOROQUINE 200MG TABLETS] 135 tablet 1     Sig: TAKE 2 TABLETS BY MOUTH MONDAYS, WEDNESDAY& FRIDAYS; TAKE 1 TABLET BY MOUTH THE OTHER 4 DAYS PER WEEK.     Future Appointments   Date Time Provider Department Center   12/20/2024 10:00 AM Beth Mercedes MD [facility]     LOV: 6/17/24   Last Refilled:4/15/24 #135 1RF   Labs:  Component      Latest Ref Rng 6/17/2024   WBC      4.0 - 11.0 x10(3) uL 4.3    RBC      3.80 - 5.30 x10(6)uL 4.63    Hemoglobin      12.0 - 16.0 g/dL 13.7    Hematocrit      35.0 - 48.0 % 40.9    MCV      80.0 - 100.0 fL 88.3    MCH      26.0 - 34.0 pg 29.6    MCHC      31.0 - 37.0 g/dL 33.5    RDW-SD      35.1 - 46.3 fL 39.4    RDW      11.0 - 15.0 % 12.2    Platelet Count      150.0 - 450.0 10(3)uL 241.0    Prelim Neutrophil Abs      1.50 - 7.70 x10 (3) uL 2.17    Neutrophils Absolute      1.50 - 7.70 x10(3) uL 2.17    Lymphocytes Absolute      1.00 - 4.00 x10(3) uL 1.55    Monocytes Absolute      0.10 - 1.00 x10(3) uL 0.48    Eosinophils Absolute      0.00 - 0.70 x10(3) uL 0.03    Basophils Absolute      0.00 - 0.20 x10(3) uL 0.06    Immature Granulocyte Absolute      0.00 - 1.00 x10(3) uL 0.01    Neutrophils %      % 50.5    Lymphocytes %      % 36.0    Monocytes %      % 11.2    Eosinophils %      % 0.7    Basophils %      % 1.4    Immature Granulocyte %      % 0.2    Color Urine      Yellow  Yellow    Clarity Urine      Clear  Clear    Spec Gravity      1.005 - 1.030  1.023    Glucose Urine      Normal mg/dL Normal    Bilirubin Urine      Negative  Negative    Ketones, UA      Negative mg/dL Negative    Blood Urine      Negative  Negative    PH Urine      5.0 - 8.0  6.0    Protein Urine      Negative mg/dL 20 !    Urobilinogen Urine      Normal  Normal    Nitrite Urine      Negative  Negative    Leukocyte Esterase       Negative  250 !    WBC Urine      0 - 5  /HPF 6-10 !    RBC Urine      0 - 2 /HPF 0-2    Bacteria Urine      None Seen /HPF Rare !    SQUAM EPI CELLS UR      None Seen /HPF Few !    RENAL TUBULAR EPITHELIAL CELLS      None Seen /HPF None Seen    TRANSITIONAL EPI CELLS      None Seen /HPF None Seen    YEAST URINE      None Seen /HPF None Seen    CREATININE      0.55 - 1.02 mg/dL 0.86    EGFR      >=60 mL/min/1.73m2 97    Albumin      3.2 - 4.8 g/dL 4.8    ALT (SGPT)      10 - 49 U/L 12    AST (SGOT)      <=34 U/L 16    C-REACTIVE PROTEIN      <1.00 mg/dL <0.40    SED RATE      0 - 20 mm/Hr 1    Anti Double Strand DNA      <10  <10    COMPLEMENT C3      82.0 - 160.0 mg/dL 81.1 (L)    COMPLEMENT C4      12.0 - 36.0 mg/dL 18.5       Legend:  ! 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  - RNP was also positive at [facility] but again likely falsely positive  - Was also found to have a + double-stranded DNA by crithidia  - Has been on Plaquenil alternating doses of 400 mg and 200 mg every other day.  Restarted 11/2020  - No kidney involvement  - seen by ophthalmology for Plaquenil exam 2/2024, Dr. Ashleigh Pinzon   - She was given a prednisone taper for some of her joint pain and took it recently which helped, refill given   - Blood work today and every 6 mos      Sicca symptoms, likely Sjogren's overlap     Urinary urgency- improved  - Has had recurrent urine infections.  Has a lot of urgency at times.       Pt will f/u in 6 mos      There is a longitudinal care relationship with me, the care plan reflects the ongoing nature of the continuous relationship of care, and the medical record indicates that there is ongoing treatment of a serious/complex medical condition which I am currently managing.   is Applicable.      Beth Mercedes MD  6/17/2024  11:26 AM

## 2024-10-14 NOTE — PROGRESS NOTES
Subjective:   Patient is a 24 year old female with hx of bipolar depression, SLE/Sjogren's who presents today for follow up chronic cystitis.    Seen in September 2024 for increased urinary urgency, frequency, and pinching sensation. Outside urine culture 50-100K mixed nafisa. VIKOR PCR sent that returned with Klebsiella. She was treated with 7 days of Augmentin without improvement (10/4-10/11). Was seen at [facility] 10/9/24 for vaginal discharge. Repeat urine culture negative and vaginitis panel 10/9/24 negative. UA today large blood only, no leuks.    She continues to experience urinary urgency, urinary frequency, pinching sensation of incomplete emptying, bladder pressure. No associated dysuria or gross hematuria. No lateralization of pressure. No hx of stones.   Resumed D Mannose in May 2024     Was seen for consult in March 2023. She was started on D Mannose and we reviewed importance of her anxiety management. Anxiety seems better.      Did complete PFT x 2 sessions with improvement in 2023  Fluids 64+oz of water.   Bowels: regular, prior constipation.   Currently on menses    History/Other:    Chief Complaint Reviewed and Verified  Nursing Notes Reviewed and   Verified  Allergies Reviewed  Medications Reviewed         Current Outpatient Medications   Medication Sig Dispense Refill    tretinoin 0.025 % External Cream APPLY A PEA SIZED AMOUNT TO FACE ONCE A DAY AT NIGHT      predniSONE 10 MG Oral Tab 30 mg daily x3 days then 20 mg daily x3 days then 10 mg daily x3 days then stop 20 tablet 0    hydroxychloroquine 200 MG Oral Tab TAKE 2 TABLETS BY MOUTH MONDAY, WEDNESDAY, FRIDAY. TAKE 1 TABLET BY MOUTH TUESDAY, THURSDAY, SATURDAY, SUNDAY 135 tablet 1    clindamycin 1 % External Lotion APPLY THIN LAYER TOPICALLY TO FACE EVERY DAY      QUEtiapine ER 50 MG Oral Tablet 24 Hr Take 2 tablets (100 mg total) by mouth nightly. TAKE AT BEDTIME PRN      D-Mannose Does not apply Powder Take by mouth.      Azelaic Acid 15 %  External Gel APPLY THIN LAYER TOPICALLY TO FACE EVERY DAY      SUMAtriptan Succinate 100 MG Oral Tab Apply 1 Application topically as needed. (Patient not taking: Reported on 10/9/2024)      Clindamycin Phosphate 1 % External Gel Apply 1 Application topically every morning.      lamoTRIgine 200 MG Oral Tab Take 1 tablet (200 mg total) by mouth 2 (two) times daily.      Adapalene-Benzoyl Peroxide 0.3-2.5 % External Gel Apply topically daily.      Clobetasol Propionate 0.05 % External Solution Apply to affected areas of the scalp daily x up to 14 days in a row as needed 25 mL 0    ketoconazole 2 % External Shampoo Apply to the scalp every other day. Leave on for 5 minutes before rinsing. 120 mL 5    QUEtiapine 25 MG Oral Tab Take 1 tablet (25 mg total) by mouth nightly. (Patient not taking: Reported on 10/9/2024)      levonorgestrel (KYLEENA) 19.5 MG Intrauterine IUD 19,500 mcg (1 each total) by Intrauterine route one time. (Patient not taking: Reported on 10/9/2024)      famotidine 40 MG Oral Tab Take 1 tablet (40 mg total) by mouth daily. (Patient not taking: Reported on 10/9/2024) 90 tablet 2    Ciclopirox Olamine 0.77 % External Suspension Apply to affected areas of the scalp daily as needed 60 mL 5    LORazepam 0.5 MG Oral Tab Take 1 tablet (0.5 mg total) by mouth daily as needed for Anxiety.      buPROPion HCl ER, XL, 300 MG Oral Tablet 24 Hr TAKE 1 TABLET BY MOUTH ONCE DAILY IN THE MORNING      OLANZapine 2.5 MG Oral Tab Take 1 tablet (2.5 mg total) by mouth nightly. 60 tablet 0    lamoTRIgine 100 MG Oral Tab Take 1 tablet (100 mg total) by mouth 2 (two) times daily. (Patient not taking: Reported on 10/9/2024)  0       Review of Systems:  Pertinent items are noted in HPI.      Objective:   LMP 09/20/2024 (Exact Date)  Estimated body mass index is 23.17 kg/m² as calculated from the following:    Height as of 10/9/24: 5' 4\" (1.626 m).    Weight as of 10/9/24: 135 lb (61.2 kg).    Tearful  Non labored  respirations    Laboratory Data:  Lab Results   Component Value Date    WBC 4.3 06/17/2024    HGB 13.7 06/17/2024    .0 06/17/2024     Lab Results   Component Value Date     08/11/2023    K 3.5 08/11/2023     08/11/2023    CO2 27.0 08/11/2023    BUN 8 08/11/2023    GLU 81 08/11/2023    GFRAA 148 05/12/2019    AST 16 06/17/2024    ALT 12 06/17/2024    TP 7.1 08/11/2023    ALB 4.8 06/17/2024    CA 8.8 08/11/2023       Urinalysis Results (last three years):  Recent Labs     11/22/21  1838 11/22/21  1846 12/21/21  1458 02/28/22  0857 08/04/22  1608 09/08/22  1120 11/22/22  1551 03/15/23  1541 03/16/23  1647 08/11/23  1012 09/11/23  1359 12/22/23  1552 06/17/24  1157 09/30/24  1520 10/09/24  1916 10/14/24  1643   COLORUR  --   --   --   --  Yellow Yellow Yellow Yellow  --  Light-Yellow  --  Colorless* Yellow  --   --   --    CLARITY  --   --   --   --  Clear Clear Clear Clear  --  Clear  --  Clear Clear  --   --   --    SPECGRAVITY 1.025  --  1.006 1.015 1.015 1.010 <=1.005 1.011 1.010 1.010 1.005 1.009 1.023 1.010 1.010 1.015   PHURINE 7.0  --   --  6.0 7.5 6.5 6.0 6.0 7.0 5.5 6.0 6.5 6.0 6.5  --  7.0   PROUR  --   --  <4.0  --  Negative Negative Negative Negative  --  Trace*  --  Negative 20*  --   --   --    GLUUR  --   --   --   --  Negative Negative Negative Negative  --  Normal  --  Normal Normal  --  Negative  --    KETUR  --   --   --   --  Negative Negative Negative Negative  --  Negative  --  Negative Negative  --   --   --    BILUR  --   --   --   --  Negative Negative Negative Negative  --  Negative  --  Negative Negative  --   --   --    BLOODURINE  --   --   --   --  Negative Negative Trace-Intact* Small*  --  Negative  --  Negative Negative  --   --   --    NITRITE Negative  --  Negative Negative Negative Negative Negative Negative Negative Negative Negative Negative Negative Negative  --  Negative   UROBILINOGEN  --   --   --   --  0.2 0.2 0.2 <2.0  --  Normal  --  Normal Normal  --    --   --    LEUUR  --   --   --   --  Negative Trace* Negative Trace*  --  75*  --  Negative 250*  --   --   --    UASA  --   --   --   --   --   --   --  Negative  --   --   --   --   --   --   --   --    WBCUR  --  >20/HPF*  --   --   --  1-5  1-5 1-5 1-5  --  1-5  --   --  6-10*  --   --   --    RBCUR  --  0-2/HPF  --   --   --  0-2  0-2 None Seen 0-2  --  0-2  --   --  0-2  --   --   --    BACUR  --  3+* None seen  --   --  Rare*  Rare* Rare* 1+*  --  1+*  --   --  Rare*  --   --   --        Urine Culture Results (last three years):  Lab Results   Component Value Date    URINECUL No Growth at 18-24 hrs. 10/09/2024    URINECUL  08/29/2024     ,000 cfu/ml Multiple species present- probable contamination.    URINECUL  12/22/2023     <10,000 cfu/ml Mixture of Gram positive organisms isolated - probable contamination.    URINECUL No Growth at 18-24 hrs. 11/22/2022       Imaging  US KIDNEY/BLADDER (CPT=76770)    Result Date: 10/2/2024  PROCEDURE:  US KIDNEY/BLADDER (CPT=76770)  COMPARISON:  None.  INDICATIONS:  R39.89 Sensation of pressure in bladder area  TECHNIQUE:  Transabdominal gray scale ultrasound imaging of the bilateral kidneys and bladder was performed.  Routine technique was utilized.   PATIENT STATED HISTORY: (As transcribed by Technologist)  Sensation of Bladder.    FINDINGS:   RIGHT KIDNEY MEASUREMENTS:  10.8 x 4.4 x 5.6 cm ECHOGENICITY:  Normal. HYDRONEPHROSIS:  None. CYSTS/STONES/MASSES:  None.  LEFT KIDNEY MEASUREMENTS:  10.5 x 4.7 x 4.7 cm ECHOGENICITY:  Normal. HYDRONEPHROSIS:  None. CYSTS/STONES/MASSES:  None.  BLADDER:  Urinary bladder is somewhat nondistended which limits assessment.  There is minimal postvoid residual volume of the bladder measuring 6 cc. OTHER:  Negative.            CONCLUSION:  The kidneys have an unremarkable sonographic appearance.  The urinary bladder was nondistended which somewhat limits assessment.  There is minimal postvoid residual volume of the urinary  bladder.   LOCATION:  [facility]     Dictated by (CST): Gonzales Lutz MD on 10/02/2024 at 9:20 AM     Finalized by (CST): Gonzales Lutz MD on 10/02/2024 at 9:20 AM         Assessment & Plan:   Urinary frequency  Urinary urgency    Will send urine for repeat VIKOR PCR.  Toviaz 8mg daily  Avoid bladder irritants  Consider repeat PFT +/- cystoscopy pending symptoms.      Suyapa Choi PA-C, 10/14/2024

## 2024-10-21 ENCOUNTER — TELEPHONE (OUTPATIENT)
Dept: SURGERY | Facility: CLINIC | Age: 24
End: 2024-10-21

## 2024-10-21 RX ORDER — CIPROFLOXACIN 500 MG/1
500 TABLET, FILM COATED ORAL 2 TIMES DAILY
Qty: 20 TABLET | Refills: 0 | Status: SHIPPED | OUTPATIENT
Start: 2024-10-21 | End: 2024-10-31

## 2024-10-21 NOTE — TELEPHONE ENCOUNTER
>100k pseudomonas, 100 e. Coli, 100 ureaplasma, sending cipro x 10 days. Complete entire course. RTC if persisting sx after abx completion.

## 2024-10-22 ENCOUNTER — PATIENT MESSAGE (OUTPATIENT)
Dept: SURGERY | Facility: CLINIC | Age: 24
End: 2024-10-22

## 2024-10-22 ENCOUNTER — TELEPHONE (OUTPATIENT)
Dept: SURGERY | Facility: CLINIC | Age: 24
End: 2024-10-22

## 2024-10-23 NOTE — TELEPHONE ENCOUNTER
This encounter is now closed.     See previous Mychart message  RN replied and relayed PA's message.

## 2024-12-16 ENCOUNTER — PATIENT MESSAGE (OUTPATIENT)
Dept: INTERNAL MEDICINE CLINIC | Facility: CLINIC | Age: 24
End: 2024-12-16

## 2024-12-16 DIAGNOSIS — R00.2 PALPITATIONS: Primary | ICD-10-CM

## 2024-12-20 ENCOUNTER — LAB ENCOUNTER (OUTPATIENT)
Dept: LAB | Facility: HOSPITAL | Age: 24
End: 2024-12-20
Attending: INTERNAL MEDICINE
Payer: COMMERCIAL

## 2024-12-20 ENCOUNTER — OFFICE VISIT (OUTPATIENT)
Dept: RHEUMATOLOGY | Facility: CLINIC | Age: 24
End: 2024-12-20

## 2024-12-20 VITALS
HEART RATE: 116 BPM | BODY MASS INDEX: 22 KG/M2 | RESPIRATION RATE: 22 BRPM | SYSTOLIC BLOOD PRESSURE: 133 MMHG | DIASTOLIC BLOOD PRESSURE: 95 MMHG | WEIGHT: 129.19 LBS

## 2024-12-20 DIAGNOSIS — M32.9 SYSTEMIC LUPUS ERYTHEMATOSUS, UNSPECIFIED SLE TYPE, UNSPECIFIED ORGAN INVOLVEMENT STATUS (HCC): ICD-10-CM

## 2024-12-20 DIAGNOSIS — M35.1 OVERLAP SYNDROME (HCC): ICD-10-CM

## 2024-12-20 DIAGNOSIS — Z51.81 ENCOUNTER FOR MEDICATION MONITORING: ICD-10-CM

## 2024-12-20 DIAGNOSIS — M32.9 SYSTEMIC LUPUS ERYTHEMATOSUS, UNSPECIFIED SLE TYPE, UNSPECIFIED ORGAN INVOLVEMENT STATUS (HCC): Primary | ICD-10-CM

## 2024-12-20 DIAGNOSIS — R00.2 PALPITATIONS: ICD-10-CM

## 2024-12-20 LAB
ALBUMIN SERPL-MCNC: 5.1 G/DL (ref 3.2–4.8)
ALBUMIN/GLOB SERPL: 2.2 {RATIO} (ref 1–2)
ALP LIVER SERPL-CCNC: 70 U/L
ALT SERPL-CCNC: 9 U/L
ANION GAP SERPL CALC-SCNC: 6 MMOL/L (ref 0–18)
AST SERPL-CCNC: 13 U/L (ref ?–34)
BASOPHILS # BLD AUTO: 0.06 X10(3) UL (ref 0–0.2)
BASOPHILS NFR BLD AUTO: 1.3 %
BILIRUB SERPL-MCNC: 0.5 MG/DL (ref 0.3–1.2)
BILIRUB UR QL: NEGATIVE
BUN BLD-MCNC: 7 MG/DL (ref 9–23)
BUN/CREAT SERPL: 6.9 (ref 10–20)
C3 SERPL-MCNC: 91 MG/DL (ref 82–160)
C4 SERPL-MCNC: 23.2 MG/DL (ref 12–36)
CALCIUM BLD-MCNC: 9.7 MG/DL (ref 8.7–10.4)
CHLORIDE SERPL-SCNC: 108 MMOL/L (ref 98–112)
CLARITY UR: CLEAR
CO2 SERPL-SCNC: 29 MMOL/L (ref 21–32)
COLOR UR: YELLOW
CREAT BLD-MCNC: 1.02 MG/DL
CRP SERPL-MCNC: <0.4 MG/DL (ref ?–1)
DEPRECATED RDW RBC AUTO: 42.5 FL (ref 35.1–46.3)
EGFRCR SERPLBLD CKD-EPI 2021: 79 ML/MIN/1.73M2 (ref 60–?)
EOSINOPHIL # BLD AUTO: 0.07 X10(3) UL (ref 0–0.7)
EOSINOPHIL NFR BLD AUTO: 1.5 %
ERYTHROCYTE [DISTWIDTH] IN BLOOD BY AUTOMATED COUNT: 13 % (ref 11–15)
ERYTHROCYTE [SEDIMENTATION RATE] IN BLOOD: 1 MM/HR
FASTING STATUS PATIENT QL REPORTED: NO
GLOBULIN PLAS-MCNC: 2.3 G/DL (ref 2–3.5)
GLUCOSE BLD-MCNC: 90 MG/DL (ref 70–99)
GLUCOSE UR-MCNC: NORMAL MG/DL
HCT VFR BLD AUTO: 42.4 %
HGB BLD-MCNC: 13.8 G/DL
HGB UR QL STRIP.AUTO: NEGATIVE
IMM GRANULOCYTES # BLD AUTO: 0.01 X10(3) UL (ref 0–1)
IMM GRANULOCYTES NFR BLD: 0.2 %
KETONES UR-MCNC: NEGATIVE MG/DL
LEUKOCYTE ESTERASE UR QL STRIP.AUTO: NEGATIVE
LYMPHOCYTES # BLD AUTO: 1.53 X10(3) UL (ref 1–4)
LYMPHOCYTES NFR BLD AUTO: 33.6 %
MCH RBC QN AUTO: 29 PG (ref 26–34)
MCHC RBC AUTO-ENTMCNC: 32.5 G/DL (ref 31–37)
MCV RBC AUTO: 89.1 FL
MONOCYTES # BLD AUTO: 0.43 X10(3) UL (ref 0.1–1)
MONOCYTES NFR BLD AUTO: 9.5 %
NEUTROPHILS # BLD AUTO: 2.45 X10 (3) UL (ref 1.5–7.7)
NEUTROPHILS # BLD AUTO: 2.45 X10(3) UL (ref 1.5–7.7)
NEUTROPHILS NFR BLD AUTO: 53.9 %
NITRITE UR QL STRIP.AUTO: NEGATIVE
OSMOLALITY SERPL CALC.SUM OF ELEC: 294 MOSM/KG (ref 275–295)
PH UR: 6.5 [PH] (ref 5–8)
PLATELET # BLD AUTO: 282 10(3)UL (ref 150–450)
POTASSIUM SERPL-SCNC: 4.2 MMOL/L (ref 3.5–5.1)
PROT SERPL-MCNC: 7.4 G/DL (ref 5.7–8.2)
PROT UR-MCNC: 30 MG/DL
RBC # BLD AUTO: 4.76 X10(6)UL
SODIUM SERPL-SCNC: 143 MMOL/L (ref 136–145)
SP GR UR STRIP: 1.02 (ref 1–1.03)
TSI SER-ACNC: 1.36 UIU/ML (ref 0.55–4.78)
UROBILINOGEN UR STRIP-ACNC: NORMAL
WBC # BLD AUTO: 4.6 X10(3) UL (ref 4–11)

## 2024-12-20 PROCEDURE — 86160 COMPLEMENT ANTIGEN: CPT

## 2024-12-20 PROCEDURE — 85652 RBC SED RATE AUTOMATED: CPT

## 2024-12-20 PROCEDURE — 84443 ASSAY THYROID STIM HORMONE: CPT

## 2024-12-20 PROCEDURE — 86225 DNA ANTIBODY NATIVE: CPT

## 2024-12-20 PROCEDURE — 99214 OFFICE O/P EST MOD 30 MIN: CPT | Performed by: INTERNAL MEDICINE

## 2024-12-20 PROCEDURE — 85025 COMPLETE CBC W/AUTO DIFF WBC: CPT

## 2024-12-20 PROCEDURE — 81001 URINALYSIS AUTO W/SCOPE: CPT

## 2024-12-20 PROCEDURE — 86140 C-REACTIVE PROTEIN: CPT

## 2024-12-20 PROCEDURE — 80053 COMPREHEN METABOLIC PANEL: CPT

## 2024-12-20 PROCEDURE — 36415 COLL VENOUS BLD VENIPUNCTURE: CPT

## 2024-12-20 NOTE — TELEPHONE ENCOUNTER
LS: Would you be okay with pt combining her physical with her upcoming appt?    Future Appointments   Date Time Provider Department Center   1/3/2025  1:45 PM Rima Tejeda MD [facility] [place]   1/31/2025 10:00 AM Berkley Vizcaino MD [facility] [address]   2/21/2025 10:00 AM Rima Tejeda MD [facility] [place]

## 2024-12-20 NOTE — PROGRESS NOTES
Lakisha Scott is a 24 year old female.    HPI:     Chief Complaint   Patient presents with    SLE     Presents for 6 month follow up. Experiences joint discomfort when weather changes. Had 3 UTI's about 2 months ago.       I had the pleasure of seeing Lakisha Scott on 12/20/2024 for follow up SLE/Sjogren's      Current medications:   mg MWF and other days 200 mg daily- since Nov 2020  Blood work:  Neg RF, CCP (2021)  2020 and 2016:  +DION,+RNP>8, +SSA>8, SSB>8  2020: +dsDNA 1:20  2007: +DION 1:1280 speckled, +SSA, SSB    Interval History:  This is a 22 yo F with hx of Bipolar disorder presents to establish care for lupus.  She was initially diagnosed when she was 6 at that time had a butterfly rash.  Never had any biopsy.  She was placed on hydroxychloroquine at that time.  She would have medication when she was 8 and was in remission.  In August 2020 she started to feel fatigue with joint and muscle pain all over.  She was following with rheumatologist Dr. Rita Soriano.  She was placed on prednisone for about 10 days which helped her pain.  She has been maintained on Plaquenil 400 mg Monday Wednesday Friday and 200 mg the other days.  Overall she was doing well on this regimen.  In February 2022 she developed appendicitis and since then has been having worsening pain all over.  Her joints ache and feel very uncomfortable.  Her joints feel like they are burning.  No redness or swelling noted.  Denies any oral ulcers, lymphadenopathy, hair loss, serositis, DVT or PE, RP or miscarriages.  Reports dry eyes and dry mouth but not significant.  Last eye exam was in November 2021 8/4/2022:   Presents for follow-up of SLE  During last visit she mentioned worsening joint pain around her thighs and her knees.  They would feel like a burning sensation but no redness or swelling  She was given some prednisone during her last visit and it helped with her symptoms.  She is doing better overall  She  has acne and is seeing a dermatologist.  She was given retinol but noticed that her skin will become more red.  She also noticed that the sun to make it worse    9/8/2022:   Presents for follow-up of SLE  She had a UTI 2 weeks ago and was on antibiotics, then she developed a significant headache and neck pain.  She was advised to go to the ED.  She was at college at that time  She ended up in the ED and they did an LP which showed total nucleated cell count of 13.  Continues to have a headache.  No fevers.  Feeling very tired and taking several naps  Continues to have hip and knee pain which is chronic.  No other active symptoms of lupus  Blood work was done in the ED showing normal ESR and CRP  She continues to have urinary urgency issues    11/21/2022:   Presents for follow-up of SLE  Also being treated for Migraines now on sumatriptan as needed   On HCQ alternating doses 400/200 mg every other day  Having a lot of fatigue and joint pain. Fatigue is worse   Had thyroid test and normal  Joint pain is also worse, mostly in hips and knees  Some pain in hands, not in elbows or shoulders  No new rashes  Also has low appetite and gets nauseous easily  Also having urinary urgency    3/15/2023:   Presents for follow-up of SLE/Sjogren's   On HCQ. Has some joint pain and worse with weather changes. Not debilitating. mostly in hips and knees  Also has some pain in hands ahmet with weather changes. Able to make fist  Still having recurrent UTI, will be seeing urology   Also getting more sores in the mouth and mucoseal, not painful  Was given steroids for her pain but never took it   Feels subjective fevers, uses ice packs almost daily    8/11/2023:   Presents for follow-up of SLE/Sjogren's   On HCQ  In May had a lot of fatigue and pain all over and had to medrol dose pack, it did help the symptoms  Having some pain in the joints ahmet with weather changes. Still functional. Mostly in the knees, hips and hands. No swelling  In  [place] felt very weak, felt different. Has been under a lot of stress lately   No rashes, sores in mouth  Eyes have been very dry, uses otc eye drops  UTIs has been better    6/17/2024:   Presents for follow-up of SLE/Sjogren's   On HCQ  Has been having more pain and more a fatigue  She took medrol gaxiola pack early May didn't help as much, she does better on prednisone   Joints are doing better now, more active now  No swelling in the joints  No rashes, has had a few sores in the mouth  No lymphadenopathy  Has not been having as much urinary tract infections  Meloxicam does not help her pain    12/20/2024:   Presents for follow-up of SLE/Sjogren's   On HCQ  She was doing well up until Sept 2024  She had 2 UTIs around Sept 2024, she started a new job Oct 2024  Has had symptoms of palpitations and felt like it was crippling anxiety  At work would be very overwhelmed  She see's a therapist and thought it could be more medical than anxiety related  When she was in college she lost a lot of weight from not eating well and stress but since she has been back she was able to gain weight up to 135 but now lost weight again down to 130, she does not have any appetite and does not like to eat  She has diffuse joint pain, achy all over but no swelling  No sores in the mouth, sob, fevers, hair loss                HISTORY:  Past Medical History:    Bipolar depression (HCC)    Depression    as sophomore     Headache    random HA an neck pain due to stress    IUD (intrauterine device) in place    Kyleena    Lupus    no current symptoms, Dx at age of 7. In remission since 11 years of age.      Other forms of systemic lupus erythematosus (HCC)      Social Hx Reviewed   Family Hx Reviewed     Medications (Active prior to today's visit):  Current Outpatient Medications   Medication Sig Dispense Refill    hydroxychloroquine 200 MG Oral Tab TAKE 2 TABLETS BY MOUTH MONDAYS, WEDNESDAY& FRIDAYS; TAKE 1 TABLET BY MOUTH THE OTHER 4 DAYS PER WEEK.  135 tablet 1    tretinoin 0.025 % External Cream APPLY A PEA SIZED AMOUNT TO FACE ONCE A DAY AT NIGHT      Fesoterodine Fumarate ER (TOVIAZ) 8 MG Oral Tablet 24 Hr Take 1 tablet (8 mg total) by mouth daily. 30 tablet 5    clindamycin 1 % External Lotion APPLY THIN LAYER TOPICALLY TO FACE EVERY DAY      QUEtiapine ER 50 MG Oral Tablet 24 Hr Take 2 tablets (100 mg total) by mouth nightly. TAKE AT BEDTIME PRN      Azelaic Acid 15 % External Gel APPLY THIN LAYER TOPICALLY TO FACE EVERY DAY      Clindamycin Phosphate 1 % External Gel Apply 1 Application topically every morning.      lamoTRIgine 200 MG Oral Tab Take 1 tablet (200 mg total) by mouth 2 (two) times daily.      Adapalene-Benzoyl Peroxide 0.3-2.5 % External Gel Apply topically daily.      Clobetasol Propionate 0.05 % External Solution Apply to affected areas of the scalp daily x up to 14 days in a row as needed 25 mL 0    ketoconazole 2 % External Shampoo Apply to the scalp every other day. Leave on for 5 minutes before rinsing. 120 mL 5    Ciclopirox Olamine 0.77 % External Suspension Apply to affected areas of the scalp daily as needed 60 mL 5    LORazepam 0.5 MG Oral Tab Take 1 tablet (0.5 mg total) by mouth daily as needed for Anxiety.      buPROPion HCl ER, XL, 300 MG Oral Tablet 24 Hr TAKE 1 TABLET BY MOUTH ONCE DAILY IN THE MORNING      OLANZapine 2.5 MG Oral Tab Take 1 tablet (2.5 mg total) by mouth nightly. 60 tablet 0    predniSONE 10 MG Oral Tab 30 mg daily x3 days then 20 mg daily x3 days then 10 mg daily x3 days then stop (Patient not taking: Reported on 12/20/2024) 20 tablet 0    D-Mannose Does not apply Powder Take by mouth.      SUMAtriptan Succinate 100 MG Oral Tab Apply 1 Application topically as needed. (Patient not taking: Reported on 10/9/2024)      QUEtiapine 25 MG Oral Tab Take 1 tablet (25 mg total) by mouth nightly. (Patient not taking: Reported on 6/17/2024)      levonorgestrel (KYLEENA) 19.5 MG Intrauterine IUD 19,500 mcg (1 each  total) by Intrauterine route one time. (Patient not taking: Reported on 10/9/2024)      famotidine 40 MG Oral Tab Take 1 tablet (40 mg total) by mouth daily. (Patient not taking: Reported on 10/9/2024) 90 tablet 2    lamoTRIgine 100 MG Oral Tab Take 1 tablet (100 mg total) by mouth 2 (two) times daily. (Patient not taking: Reported on 10/9/2024)  0     .cmed  Allergies:  No Known Allergies      ROS:   All other ROS are negative.     PHYSICAL EXAM:   GEN: AAOx3, NAD  HEENT: EOMI, PERRLA, no injection or icterus, oral mucosa moist, no oral lesions. No lymphadenopathy. No facial rash  CVS: RRR, no murmurs rubs or gallops. Equal 2+ distal pulses.   LUNGS: CTAB, no increased work of breathing  ABDOMEN:  soft NT/ND, +BS, no HSM  SKIN: No rashes or skin lesions. No nail findings  MSK:  Cervical spine: FROM  Hands: no synovitis in DIP, PIP and MCP, strong full fists  Wrist: FROM, no pain or swelling or warmth on palpation  Elbow: FROM, no pain or swelling or warmth on palpation  Shoulders: FROM, no pain or swelling or warmth on palpation  Hip: normal log roll, no lateral hip pain, DAPHNIE test negative b/l  Knees: FROM, no warmth or effusion present. No pain with ROM.   Ankles: FROM, no pain or swelling or warmth on palpation  Feet: no pain with MTP squeeze, no toe swelling or pain or warmth on palpation with FROM  Spine: no lumbar or sacral pain on palpation.  NEURO: Cranial nerves II-XII intact grossly. 5/5 strength throughout in both upper and lower extremities, sensation intact.  PSYCH: normal mood       LABS:       Component      Latest Ref Rng & Units 11/6/2020 10/6/2020 1/30/2016   Anti-dsDNA      0 - 4 IU/mL   1     Anti-Sm      0.0-<1.0 AI   0.4     Anti-SmRNP      0.0-<1.0 AI   1.4 (H)     Anti-SS-A (Ro)      0.0-<1.0 AI   >8.0 (H)     Anti-SS-B (La)      0.0-<1.0 AI   >8.0 (H)     Anti-Centromere B      0.0-<1.0 AI   <0.2     Anti-Scl-70      0.0-<1.0 AI   <0.2     Anti-Chromatin      0.0-<1.0 AI   <0.2      Anti-Ribosomal P      0.0-<1.0 AI   <0.2     Anti-RNP      0.0-<1.0 AI   >8.0 (H)     Anti-MICKI-1      0.0-<1.0 AI   <0.2     SSA AUTOAB      <100 AU/mL     391 (H)   SSB AUTOAB      <100 AU/mL     486 (H)   Sm AUTOAB ([place])      0 - 99 AU/mL         RNP AUTOAB      <100 AU/mL     144 (H)   Scl-70 AUTOAB      0 - 99 AU/mL         MICKI-1 AUTOAB      0 - 99 AU/mL         dsDNA AUTOAB      0 - 99 IU/mL         CENTROMERE AUTOAB      0 - 99 AU/mL         HISTONE AUTOAB      0 - 99 AU/mL         DION PROFILE      NEGATIVE         COMPLEMENT C4      14.0 - 28.0 mg/dL 24.0       COMPLEMENT C3      51.0 - 160.0 mg/dl 117.0       DION SCREEN      Negative   Positive (A) Positive (A)   dsDNA Crithidia luciliae IFA      Negative Positive (A)       dsDNA Crithidia luciliae Titer      Neg<1:10 1:20 (H)          Component      Latest Ref Rng & Units 9/12/2011   Anti-dsDNA      0 - 4 IU/mL     Anti-Sm      0.0-<1.0 AI     Anti-SmRNP      0.0-<1.0 AI     Anti-SS-A (Ro)      0.0-<1.0 AI     Anti-SS-B (La)      0.0-<1.0 AI     Anti-Centromere B      0.0-<1.0 AI     Anti-Scl-70      0.0-<1.0 AI     Anti-Chromatin      0.0-<1.0 AI     Anti-Ribosomal P      0.0-<1.0 AI     Anti-RNP      0.0-<1.0 AI     Anti-MICKI-1      0.0-<1.0 AI     SSA AUTOAB      <100 AU/mL 617 (H)   SSB AUTOAB      <100 AU/mL 333 (H)   Sm AUTOAB ([place])      0 - 99 AU/mL <100   RNP AUTOAB      <100 AU/mL <100   Scl-70 AUTOAB      0 - 99 AU/mL <100   MICKI-1 AUTOAB      0 - 99 AU/mL <100   dsDNA AUTOAB      0 - 99 IU/mL <100   CENTROMERE AUTOAB      0 - 99 AU/mL <100   HISTONE AUTOAB      0 - 99 AU/mL <100   DION PROFILE      NEGATIVE POSITIVE (H)   COMPLEMENT C4      14.0 - 28.0 mg/dL 21.6   COMPLEMENT C3      51.0 - 160.0 mg/dl 104.0   DION SCREEN      Negative     dsDNA Crithidia luciliae IFA      Negative     dsDNA Crithidia luciliae Titer      Neg<1:10        Component      Latest Ref Rng & Units 11/24/2021 8/11/2021 11/6/2020   Cyclic Citrullinated Peptides IgG  Quantitative      <3.0 U/mL     0.5   Cyclic Citrullinated Peptides IgG Qualitative      Negative     Negative   dsDNA Crithidia luciliae IFA      Negative Negative   Positive (A)   COMPLEMENT C3      51.0 - 160.0 mg/dl 105.0 104.0 117.0   COMPLEMENT C4      14.0 - 28.0 mg/dL 27.0 22.0 24.0   RHEUMATOID FACTOR      <=14 IU/mL     <10   Anti-dsDNA      0 - 4 IU/mL   2     SED RATE      0 - 20 mm/hr   5     C-Reactive Protein      <=0.50 mg/dL   <0.30        Imaging:     [id number]    ASSESSMENT/PLAN:     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  - RNP was also positive at [facility] but again likely falsely positive  - Was also found to have a + double-stranded DNA by crithidia  - Has been on Plaquenil alternating doses of 400 mg and 200 mg every other day.  Restarted 11/2020  - No kidney involvement  - seen by ophthalmology for Plaquenil exam 2/2024, Dr. Ashleigh Pinzon   - Blood work today and every 6 mos      Tachycardia, palpitations  - Has been having tachycardia for the past 2 months.  Her PCP ordered TSH levels  - Will order workup for her lupus but low suspicion that is caused by her lupus  - She may need to see cardiology    Sicca symptoms, likely Sjogren's overlap     Urinary urgency- improved  - Has had recurrent urine infections.  Has a lot of urgency at times.      Pt will f/u in 6 mos     There is a longitudinal care relationship with me, the care plan reflects the ongoing nature of the continuous relationship of care, and the medical record indicates that there is ongoing treatment of a serious/complex medical condition which I am currently managing.  is Applicable.     Beth Mercedes MD  12/20/2024  10:16 AM

## 2024-12-20 NOTE — TELEPHONE ENCOUNTER
LS: Pt unable to come in on 12/30. Scheduled pt for 1/3 1:45PM.    Future Appointments   Date Time Provider Department Center   12/20/2024 10:00 AM Beth Mercedes MD [id number]   12/30/2024 11:15 AM Rima Tejeda MD [facility] [place]   1/3/2025  1:45 PM Rima Tejeda MD [facility] [place]   1/31/2025 10:00 AM Berkley Vizcaino MD [facility] [address]   2/21/2025 10:00 AM Rima Tejeda MD [facility] [place]

## 2024-12-23 LAB — DSDNA AB TITR SER: <10 {TITER}

## 2025-01-03 ENCOUNTER — OFFICE VISIT (OUTPATIENT)
Dept: INTERNAL MEDICINE CLINIC | Facility: CLINIC | Age: 25
End: 2025-01-03
Payer: COMMERCIAL

## 2025-01-03 VITALS
SYSTOLIC BLOOD PRESSURE: 102 MMHG | DIASTOLIC BLOOD PRESSURE: 70 MMHG | TEMPERATURE: 98 F | HEIGHT: 64 IN | HEART RATE: 90 BPM | WEIGHT: 135 LBS | OXYGEN SATURATION: 99 % | BODY MASS INDEX: 23.05 KG/M2 | RESPIRATION RATE: 14 BRPM

## 2025-01-03 DIAGNOSIS — R00.2 PALPITATIONS: ICD-10-CM

## 2025-01-03 DIAGNOSIS — Z00.00 ENCOUNTER FOR ROUTINE ADULT MEDICAL EXAMINATION: Primary | ICD-10-CM

## 2025-01-03 PROCEDURE — 99395 PREV VISIT EST AGE 18-39: CPT | Performed by: INTERNAL MEDICINE

## 2025-01-03 PROCEDURE — 99213 OFFICE O/P EST LOW 20 MIN: CPT | Performed by: INTERNAL MEDICINE

## 2025-01-03 NOTE — PROGRESS NOTES
[facility] Internal Medicine Office Note  Chief Complaint:   Chief Complaint   Patient presents with    Physical       HPI:   This is a 24 year old female coming in for physical  HPI    She notes she started a new job in  and about 3 weeks in she started having palpitations.   She had no appetite    She had been going to therapy every other week and then started going to therapy weekly  She has been telling herself there is no reason to be stressed    Palpitations that come and go every day  She is not sure if the palpitations are related to anxiety.  She cut out caffeine which did not make a difference    Pap due in 11/2026  Received flu shot    Patient Active Problem List   Diagnosis    Lupus (HCC)    Anemia    Anxiety state    Acne    Major depressive disorder    Acne vulgaris    Major depression, recurrent (HCC)    Dry eye syndrome of both eyes    Overlap syndrome (HCC)    Bipolar depression (HCC)    Other forms of systemic lupus erythematosus (HCC)     Past Surgical History:   Procedure Laterality Date    Laparoscopic appendectomy  02/18/2022    Other surgical history      Oral surgery/for preparation of braces     Family History   Problem Relation Age of Onset    Depression Mother         used to be in counseling and used to take medications    Anxiety Mother     Hypertension Mother     Alcohol and Other Disorders Associated Father     No Known Problems Sister     No Known Problems Brother     Hypertension Maternal Grandmother     Hypertension Paternal Grandmother     Depression Paternal Grandmother     Anxiety Paternal Grandmother         I reviewed her's Past Medical History, Past Surgical History, Family History and   Social History updated shows  Social History     Socioeconomic History    Marital status: Single   Tobacco Use    Smoking status: Never    Smokeless tobacco: Never   Vaping Use    Vaping status: Never Used   Substance and Sexual Activity    Alcohol use: Not Currently    Drug use:  No    Sexual activity: Not Currently     Birth control/protection: I.U.D.   Other Topics Concern    Caffeine Concern Yes    Exercise No    Seat Belt Yes     Allergies:  Allergies[1]  Current Outpatient Medications   Medication Sig Dispense Refill    hydroxychloroquine 200 MG Oral Tab TAKE 2 TABLETS BY MOUTH MONDAYS, WEDNESDAY& FRIDAYS; TAKE 1 TABLET BY MOUTH THE OTHER 4 DAYS PER WEEK. 135 tablet 1    tretinoin 0.025 % External Cream APPLY A PEA SIZED AMOUNT TO FACE ONCE A DAY AT NIGHT      clindamycin 1 % External Lotion APPLY THIN LAYER TOPICALLY TO FACE EVERY DAY      QUEtiapine ER 50 MG Oral Tablet 24 Hr Take 2 tablets (100 mg total) by mouth nightly. TAKE AT BEDTIME PRN      D-Mannose Does not apply Powder Take by mouth.      lamoTRIgine 200 MG Oral Tab Take 1 tablet (200 mg total) by mouth 2 (two) times daily.      ketoconazole 2 % External Shampoo Apply to the scalp every other day. Leave on for 5 minutes before rinsing. 120 mL 5    LORazepam 0.5 MG Oral Tab Take 1 tablet (0.5 mg total) by mouth daily as needed for Anxiety.      buPROPion HCl ER, XL, 300 MG Oral Tablet 24 Hr TAKE 1 TABLET BY MOUTH ONCE DAILY IN THE MORNING      OLANZapine 2.5 MG Oral Tab Take 1 tablet (2.5 mg total) by mouth nightly. 60 tablet 0         REVIEW OF SYSTEMS:   Review of Systems   Constitutional:  Negative for fever.   HENT:  Negative for congestion.    Eyes:  Negative for visual disturbance.   Respiratory:  Negative for shortness of breath.    Cardiovascular:  Negative for chest pain.   Gastrointestinal:  Negative for constipation.   Genitourinary:  Negative for dysuria.   Neurological: Negative.    Hematological: Negative.    Psychiatric/Behavioral: Negative.          EXAM:   /70   Pulse 90   Temp 97.8 °F (36.6 °C) (Temporal)   Resp 14   Ht 5' 4\" (1.626 m)   Wt 135 lb (61.2 kg)   LMP 12/12/2024 (Exact Date)   SpO2 99%   BMI 23.17 kg/m²  Estimated body mass index is 23.17 kg/m² as calculated from the following:     Height as of this encounter: 5' 4\" (1.626 m).    Weight as of this encounter: 135 lb (61.2 kg).   Vital signs reviewed. Appears stated age, well groomed.  Physical Exam  Vitals reviewed.   Constitutional:       General: She is not in acute distress.     Appearance: She is well-developed.   HENT:      Head: Normocephalic and atraumatic.      Right Ear: Tympanic membrane normal.      Left Ear: Tympanic membrane normal.   Eyes:      Conjunctiva/sclera: Conjunctivae normal.   Cardiovascular:      Rate and Rhythm: Normal rate and regular rhythm.      Heart sounds: Normal heart sounds.   Pulmonary:      Effort: Pulmonary effort is normal.      Breath sounds: Normal breath sounds.   Musculoskeletal:      Cervical back: Neck supple.      Right lower leg: No edema.      Left lower leg: No edema.   Skin:     General: Skin is warm and dry.   Neurological:      General: No focal deficit present.      Mental Status: She is alert.   Psychiatric:         Mood and Affect: Mood normal.          ASSESSMENT AND PLAN:   Lakisha Scott is a 24 year old female with  1. Encounter for routine adult medical examination    2. Palpitations          The plan is as follows  Glendy was seen today for physical.    Diagnoses and all orders for this visit:    Encounter for routine adult medical examination  -Reviewed blood work results.  Normal hemoglobin and TSH.  -Up-to-date with Pap smear-due in November 2026.  -Up-to-date with flu, tetanus, and HPV vaccines.    Palpitations -patient having frequent palpitations so will proceed with monitor.  Reviewed normal CBC, CMP and TSH.  Patient also notes anxiety and is under the care of a counselor.  -     CARD MONITOR HOLTER 48 HOUR (CPT=93225); Future        Meds & Refills for this Visit:  Requested Prescriptions      No prescriptions requested or ordered in this encounter       Imaging & Consults:  CARD MONITOR HOLTER 48 HOUR (CPT=93225)    Health Maintenance Due   Topic Date Due    HPV  Vaccines (1 - 3-dose series) Never done    Annual Physical  12/27/2024    Annual Depression Screening  01/01/2025    HTN: BP Follow-Up  01/20/2025     Patient/Caregiver Education: Patient/Caregiver Education: There are no barriers to learning. Medical education done. Outcome: Patient verbalizes understanding. Patient is notified to call with any questions, complications, allergies, or worsening or changing symptoms.  Patient is to call with any side effects or complications from the treatments as a result of today.     Rima Tejeda MD         [1] No Known Allergies

## 2025-01-03 NOTE — PATIENT INSTRUCTIONS
For reflux:   Pepcid over the counter ok to take as needed or scheduled  Tums ok to take as well, may be faster acting.     Call to schedule holter monitor [phone] to schedule

## 2025-02-11 DIAGNOSIS — M35.1 OVERLAP SYNDROME (HCC): ICD-10-CM

## 2025-02-11 DIAGNOSIS — M32.9 SYSTEMIC LUPUS ERYTHEMATOSUS, UNSPECIFIED SLE TYPE, UNSPECIFIED ORGAN INVOLVEMENT STATUS (HCC): ICD-10-CM

## 2025-02-11 RX ORDER — HYDROXYCHLOROQUINE SULFATE 200 MG/1
TABLET, FILM COATED ORAL
Qty: 135 TABLET | Refills: 1 | Status: SHIPPED | OUTPATIENT
Start: 2025-02-11

## 2025-02-11 NOTE — TELEPHONE ENCOUNTER
Requested Prescriptions     Pending Prescriptions Disp Refills    hydroxychloroquine 200 MG Oral Tab 135 tablet 1     Sig: TAKE 2 TABLETS BY MOUTH MONDAYS, WEDNESDAY& FRIDAYS; TAKE 1 TABLET BY MOUTH THE OTHER 4 DAYS PER WEEK.     LOV: 12/20/24  Future Appointments   Date Time Provider Department Center   6/17/2025  4:40 PM Beth Mercedes MD [id number]     Labs:   Component      Latest Ref Rng 12/20/2024   WBC      4.0 - 11.0 x10(3) uL 4.6    RBC      3.80 - 5.30 x10(6)uL 4.76    Hemoglobin      12.0 - 16.0 g/dL 13.8    Hematocrit      35.0 - 48.0 % 42.4    MCV      80.0 - 100.0 fL 89.1    MCH      26.0 - 34.0 pg 29.0    MCHC      31.0 - 37.0 g/dL 32.5    RDW-SD      35.1 - 46.3 fL 42.5    RDW      11.0 - 15.0 % 13.0    Platelet Count      150.0 - 450.0 10(3)uL 282.0    Prelim Neutrophil Abs      1.50 - 7.70 x10 (3) uL 2.45    Neutrophils Absolute      1.50 - 7.70 x10(3) uL 2.45    Lymphocytes Absolute      1.00 - 4.00 x10(3) uL 1.53    Monocytes Absolute      0.10 - 1.00 x10(3) uL 0.43    Eosinophils Absolute      0.00 - 0.70 x10(3) uL 0.07    Basophils Absolute      0.00 - 0.20 x10(3) uL 0.06    Immature Granulocyte Absolute      0.00 - 1.00 x10(3) uL 0.01    Neutrophils %      % 53.9    Lymphocytes %      % 33.6    Monocytes %      % 9.5    Eosinophils %      % 1.5    Basophils %      % 1.3    Immature Granulocyte %      % 0.2    Glucose      70 - 99 mg/dL 90    Sodium      136 - 145 mmol/L 143    Potassium      3.5 - 5.1 mmol/L 4.2    Chloride      98 - 112 mmol/L 108    Carbon Dioxide, Total      21.0 - 32.0 mmol/L 29.0    ANION GAP      0 - 18 mmol/L 6    BUN      9 - 23 mg/dL 7 (L)    CREATININE      0.55 - 1.02 mg/dL 1.02    BUN/CREATININE RATIO      10.0 - 20.0  6.9 (L)    CALCIUM      8.7 - 10.4 mg/dL 9.7    CALCULATED OSMOLALITY      275 - 295 mOsm/kg 294    EGFR      >=60 mL/min/1.73m2 79    ALT (SGPT)      10 - 49 U/L 9 (L)    AST (SGOT)      <34 U/L 13    ALKALINE PHOSPHATASE      37 - 98 U/L 70     Total Bilirubin      0.3 - 1.2 mg/dL 0.5    PROTEIN, TOTAL      5.7 - 8.2 g/dL 7.4    Albumin      3.2 - 4.8 g/dL 5.1 (H)    Globulin      2.0 - 3.5 g/dL 2.3    A/G Ratio      1.0 - 2.0  2.2 (H)    Patient Fasting for CMP? No    Color Urine      Yellow  Yellow    Clarity Urine      Clear  Clear    Spec Gravity      1.005 - 1.030  1.021    Glucose Urine      Normal mg/dL Normal    Bilirubin Urine      Negative  Negative    Ketones, UA      Negative mg/dL Negative    Blood Urine      Negative  Negative    PH Urine      5.0 - 8.0  6.5    Protein Urine      Negative mg/dL 30 !    Urobilinogen Urine      Normal  Normal    Nitrite Urine      Negative  Negative    Leukocyte Esterase       Negative  Negative    WBC Urine      0 - 5 /HPF 1-5    RBC Urine      0 - 2 /HPF 0-2    Bacteria Urine      None Seen /HPF None Seen    SQUAM EPI CELLS UR      None Seen /HPF Few !    RENAL TUBULAR EPITHELIAL CELLS      None Seen /HPF None Seen    TRANSITIONAL EPI CELLS      None Seen /HPF None Seen    YEAST URINE      None Seen /HPF None Seen    Anti Double Strand DNA      <10  <10    Reviewed By: Jose To M.D.    C-REACTIVE PROTEIN      <1.00 mg/dL <0.40    SED RATE      0 - 20 mm/Hr 1    COMPLEMENT C3      82.0 - 160.0 mg/dL 91.0    COMPLEMENT C4      12.0 - 36.0 mg/dL 23.2       Legend:  (L) Low  (H) High  ! 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  - RNP was also positive at [facility] but again likely falsely positive  - Was also found to have a + double-stranded DNA by crithidia  - Has been on Plaquenil alternating doses of 400 mg and 200 mg every other day.  Restarted 11/2020  - No kidney involvement  - seen by ophthalmology for Plaquenil exam 2/2024, Dr. Ashleigh Pinzon   - Blood  work today and every 6 mos      Tachycardia, palpitations  - Has been having tachycardia for the past 2 months.  Her PCP ordered TSH levels  - Will order workup for her lupus but low suspicion that is caused by her lupus  - She may need to see cardiology     Sicca symptoms, likely Sjogren's overlap     Urinary urgency- improved  - Has had recurrent urine infections.  Has a lot of urgency at times.       Pt will f/u in 6 mos      There is a longitudinal care relationship with me, the care plan reflects the ongoing nature of the continuous relationship of care, and the medical record indicates that there is ongoing treatment of a serious/complex medical condition which I am currently managing.  is Applicable.      Beth Mercedes MD  12/20/2024  10:16 AM

## 2025-02-28 ENCOUNTER — TELEPHONE (OUTPATIENT)
Dept: RHEUMATOLOGY | Facility: CLINIC | Age: 25
End: 2025-02-28

## 2025-06-18 ENCOUNTER — TELEPHONE (OUTPATIENT)
Age: 25
End: 2025-06-18

## 2025-06-18 NOTE — TELEPHONE ENCOUNTER
1st No show 06/17/25, [facility], Dr Mercedes, follow up,TE created 06/18/25, letter sent via [email or web address].

## 2025-07-09 ENCOUNTER — LAB ENCOUNTER (OUTPATIENT)
Dept: LAB | Facility: HOSPITAL | Age: 25
End: 2025-07-09
Attending: INTERNAL MEDICINE
Payer: COMMERCIAL

## 2025-07-09 ENCOUNTER — OFFICE VISIT (OUTPATIENT)
Age: 25
End: 2025-07-09

## 2025-07-09 VITALS
BODY MASS INDEX: 22.57 KG/M2 | RESPIRATION RATE: 16 BRPM | WEIGHT: 132.19 LBS | HEART RATE: 94 BPM | HEIGHT: 64 IN | DIASTOLIC BLOOD PRESSURE: 79 MMHG | SYSTOLIC BLOOD PRESSURE: 110 MMHG

## 2025-07-09 DIAGNOSIS — M32.9 SYSTEMIC LUPUS ERYTHEMATOSUS, UNSPECIFIED SLE TYPE, UNSPECIFIED ORGAN INVOLVEMENT STATUS (HCC): Primary | ICD-10-CM

## 2025-07-09 DIAGNOSIS — M32.9 SYSTEMIC LUPUS ERYTHEMATOSUS, UNSPECIFIED SLE TYPE, UNSPECIFIED ORGAN INVOLVEMENT STATUS (HCC): ICD-10-CM

## 2025-07-09 DIAGNOSIS — M35.1 OVERLAP SYNDROME (HCC): ICD-10-CM

## 2025-07-09 DIAGNOSIS — Z51.81 ENCOUNTER FOR MEDICATION MONITORING: ICD-10-CM

## 2025-07-09 LAB
ALT SERPL-CCNC: 8 U/L (ref 10–49)
AST SERPL-CCNC: 12 U/L (ref ?–34)
BASOPHILS # BLD AUTO: 0.03 X10(3) UL (ref 0–0.2)
BASOPHILS NFR BLD AUTO: 0.5 %
BILIRUB UR QL: NEGATIVE
C3 SERPL-MCNC: 102.8 MG/DL (ref 82–160)
C4 SERPL-MCNC: 22.4 MG/DL (ref 12–36)
CLARITY UR: CLEAR
COLOR UR: YELLOW
CREAT BLD-MCNC: 0.84 MG/DL (ref 0.55–1.02)
CRP SERPL-MCNC: <0.5 MG/DL (ref ?–0.5)
DEPRECATED RDW RBC AUTO: 42.5 FL (ref 35.1–46.3)
EGFRCR SERPLBLD CKD-EPI 2021: 99 ML/MIN/1.73M2 (ref 60–?)
EOSINOPHIL # BLD AUTO: 0.07 X10(3) UL (ref 0–0.7)
EOSINOPHIL NFR BLD AUTO: 1.2 %
ERYTHROCYTE [DISTWIDTH] IN BLOOD BY AUTOMATED COUNT: 13.1 % (ref 11–15)
ERYTHROCYTE [SEDIMENTATION RATE] IN BLOOD: 1 MM/HR (ref 0–20)
GLUCOSE UR-MCNC: NORMAL MG/DL
HCT VFR BLD AUTO: 39.9 % (ref 35–48)
HGB BLD-MCNC: 13.1 G/DL (ref 12–16)
HGB UR QL STRIP.AUTO: NEGATIVE
IMM GRANULOCYTES # BLD AUTO: 0.01 X10(3) UL (ref 0–1)
IMM GRANULOCYTES NFR BLD: 0.2 %
KETONES UR-MCNC: NEGATIVE MG/DL
LEUKOCYTE ESTERASE UR QL STRIP.AUTO: 25
LYMPHOCYTES # BLD AUTO: 1.8 X10(3) UL (ref 1–4)
LYMPHOCYTES NFR BLD AUTO: 32 %
MCH RBC QN AUTO: 29 PG (ref 26–34)
MCHC RBC AUTO-ENTMCNC: 32.8 G/DL (ref 31–37)
MCV RBC AUTO: 88.3 FL (ref 80–100)
MONOCYTES # BLD AUTO: 0.55 X10(3) UL (ref 0.1–1)
MONOCYTES NFR BLD AUTO: 9.8 %
NEUTROPHILS # BLD AUTO: 3.16 X10 (3) UL (ref 1.5–7.7)
NEUTROPHILS # BLD AUTO: 3.16 X10(3) UL (ref 1.5–7.7)
NEUTROPHILS NFR BLD AUTO: 56.3 %
NITRITE UR QL STRIP.AUTO: NEGATIVE
PH UR: 5.5 [PH] (ref 5–8)
PLATELET # BLD AUTO: 249 10(3)UL (ref 150–450)
PROT UR-MCNC: 20 MG/DL
RBC # BLD AUTO: 4.52 X10(6)UL (ref 3.8–5.3)
SP GR UR STRIP: 1.02 (ref 1–1.03)
UROBILINOGEN UR STRIP-ACNC: NORMAL
WBC # BLD AUTO: 5.6 X10(3) UL (ref 4–11)

## 2025-07-09 PROCEDURE — 86160 COMPLEMENT ANTIGEN: CPT

## 2025-07-09 PROCEDURE — 85652 RBC SED RATE AUTOMATED: CPT

## 2025-07-09 PROCEDURE — 84460 ALANINE AMINO (ALT) (SGPT): CPT

## 2025-07-09 PROCEDURE — 86225 DNA ANTIBODY NATIVE: CPT

## 2025-07-09 PROCEDURE — 36415 COLL VENOUS BLD VENIPUNCTURE: CPT

## 2025-07-09 PROCEDURE — 81001 URINALYSIS AUTO W/SCOPE: CPT

## 2025-07-09 PROCEDURE — 86140 C-REACTIVE PROTEIN: CPT

## 2025-07-09 PROCEDURE — 82565 ASSAY OF CREATININE: CPT

## 2025-07-09 PROCEDURE — 99214 OFFICE O/P EST MOD 30 MIN: CPT | Performed by: INTERNAL MEDICINE

## 2025-07-09 PROCEDURE — 85025 COMPLETE CBC W/AUTO DIFF WBC: CPT

## 2025-07-09 PROCEDURE — 84450 TRANSFERASE (AST) (SGOT): CPT

## 2025-07-09 RX ORDER — CYCLOBENZAPRINE HCL 5 MG
5 TABLET ORAL NIGHTLY PRN
Qty: 30 TABLET | Refills: 0 | Status: SHIPPED | OUTPATIENT
Start: 2025-07-09

## 2025-07-09 NOTE — PROGRESS NOTES
Lakisha Scott is a 24 year old female.    HPI:     Chief Complaint   Patient presents with    SLE    Medication Follow-Up       I had the pleasure of seeing Lakisha Scott on 7/9/2025 for follow up SLE/Sjogren's      Current medications:   mg MWF and other days 200 mg daily- since Nov 2020  Blood work:  Neg RF, CCP (2021)  2020 and 2016:  +DION,+RNP>8, +SSA>8, SSB>8  2020: +dsDNA 1:20  2007: +DION 1:1280 speckled, +SSA, SSB    Interval History:  This is a 20 yo F with hx of Bipolar disorder presents to establish care for lupus.  She was initially diagnosed when she was 6 at that time had a butterfly rash.  Never had any biopsy.  She was placed on hydroxychloroquine at that time.  She would have medication when she was 8 and was in remission.  In August 2020 she started to feel fatigue with joint and muscle pain all over.  She was following with rheumatologist Dr. Rita Soriano.  She was placed on prednisone for about 10 days which helped her pain.  She has been maintained on Plaquenil 400 mg Monday Wednesday Friday and 200 mg the other days.  Overall she was doing well on this regimen.  In February 2022 she developed appendicitis and since then has been having worsening pain all over.  Her joints ache and feel very uncomfortable.  Her joints feel like they are burning.  No redness or swelling noted.  Denies any oral ulcers, lymphadenopathy, hair loss, serositis, DVT or PE, RP or miscarriages.  Reports dry eyes and dry mouth but not significant.  Last eye exam was in November 2021 8/4/2022:   Presents for follow-up of SLE  During last visit she mentioned worsening joint pain around her thighs and her knees.  They would feel like a burning sensation but no redness or swelling  She was given some prednisone during her last visit and it helped with her symptoms.  She is doing better overall  She has acne and is seeing a dermatologist.  She was given retinol but noticed that her skin will  become more red.  She also noticed that the sun to make it worse    9/8/2022:   Presents for follow-up of SLE  She had a UTI 2 weeks ago and was on antibiotics, then she developed a significant headache and neck pain.  She was advised to go to the ED.  She was at college at that time  She ended up in the ED and they did an LP which showed total nucleated cell count of 13.  Continues to have a headache.  No fevers.  Feeling very tired and taking several naps  Continues to have hip and knee pain which is chronic.  No other active symptoms of lupus  Blood work was done in the ED showing normal ESR and CRP  She continues to have urinary urgency issues    11/21/2022:   Presents for follow-up of SLE  Also being treated for Migraines now on sumatriptan as needed   On HCQ alternating doses 400/200 mg every other day  Having a lot of fatigue and joint pain. Fatigue is worse   Had thyroid test and normal  Joint pain is also worse, mostly in hips and knees  Some pain in hands, not in elbows or shoulders  No new rashes  Also has low appetite and gets nauseous easily  Also having urinary urgency    3/15/2023:   Presents for follow-up of SLE/Sjogren's   On HCQ. Has some joint pain and worse with weather changes. Not debilitating. mostly in hips and knees  Also has some pain in hands ahmet with weather changes. Able to make fist  Still having recurrent UTI, will be seeing urology   Also getting more sores in the mouth and mucoseal, not painful  Was given steroids for her pain but never took it   Feels subjective fevers, uses ice packs almost daily    8/11/2023:   Presents for follow-up of SLE/Sjogren's   On HCQ  In May had a lot of fatigue and pain all over and had to medrol dose pack, it did help the symptoms  Having some pain in the joints ahmet with weather changes. Still functional. Mostly in the knees, hips and hands. No swelling  In June felt very weak, felt different. Has been under a lot of stress lately   No rashes, sores in  mouth  Eyes have been very dry, uses otc eye drops  UTIs has been better    6/17/2024:   Presents for follow-up of SLE/Sjogren's   On HCQ  Has been having more pain and more a fatigue  She took medrol gaxiola pack early May didn't help as much, she does better on prednisone   Joints are doing better now, more active now  No swelling in the joints  No rashes, has had a few sores in the mouth  No lymphadenopathy  Has not been having as much urinary tract infections  Meloxicam does not help her pain    12/20/2024:   Presents for follow-up of SLE/Sjogren's   On HCQ  She was doing well up until Sept 2024  She had 2 UTIs around Sept 2024, she started a new job Oct 2024  Has had symptoms of palpitations and felt like it was crippling anxiety  At work would be very overwhelmed  She see's a therapist and thought it could be more medical than anxiety related  When she was in college she lost a lot of weight from not eating well and stress but since she has been back she was able to gain weight up to 135 but now lost weight again down to 130, she does not have any appetite and does not like to eat  She has diffuse joint pain, achy all over but no swelling  No sores in the mouth, sob, fevers, hair loss    7/9/2025:   Presents for follow-up of SLE/Sjogren's   On HCQ  She lost her job recently and is stressed  At times she feels like she feels sick and but then doesn't, she will have joint pain and muscle pain and feel achy all over  No swelling in the joints  No rashes, no sores in the mouth  No recent urine infection                  HISTORY:  Past Medical History:    Bipolar depression (HCC)    Depression    as sophomore     Headache    random HA an neck pain due to stress    IUD (intrauterine device) in place    [place]    Lupus    no current symptoms, Dx at age of 7. In remission since 11 years of age.      Other forms of systemic lupus erythematosus (HCC)      Social Hx Reviewed   Family Hx Reviewed     Medications (Active  prior to today's visit):  Current Outpatient Medications   Medication Sig Dispense Refill    predniSONE 10 MG Oral Tab 30 mg daily x3 days then 20 mg daily x3 days then 10 mg daily x3 days then stop 20 tablet 0    hydroxychloroquine 200 MG Oral Tab TAKE 2 TABLETS BY MOUTH MONDAYS, WEDNESDAY& FRIDAYS; TAKE 1 TABLET BY MOUTH THE OTHER 4 DAYS PER WEEK. 135 tablet 1    tretinoin 0.025 % External Cream APPLY A PEA SIZED AMOUNT TO FACE ONCE A DAY AT NIGHT      clindamycin 1 % External Lotion APPLY THIN LAYER TOPICALLY TO FACE EVERY DAY      QUEtiapine ER 50 MG Oral Tablet 24 Hr Take 2 tablets (100 mg total) by mouth nightly. TAKE AT BEDTIME PRN      D-Mannose Does not apply Powder Take by mouth.      lamoTRIgine 200 MG Oral Tab Take 1 tablet (200 mg total) by mouth 2 (two) times daily.      ketoconazole 2 % External Shampoo Apply to the scalp every other day. Leave on for 5 minutes before rinsing. 120 mL 5    LORazepam 0.5 MG Oral Tab Take 1 tablet (0.5 mg total) by mouth daily as needed for Anxiety.      buPROPion HCl ER, XL, 300 MG Oral Tablet 24 Hr TAKE 1 TABLET BY MOUTH ONCE DAILY IN THE MORNING      OLANZapine 2.5 MG Oral Tab Take 1 tablet (2.5 mg total) by mouth nightly. 60 tablet 0     .cmed  Allergies:  No Known Allergies      ROS:   All other ROS are negative.     PHYSICAL EXAM:   GEN: AAOx3, NAD  HEENT: EOMI, PERRLA, no injection or icterus, oral mucosa moist, no oral lesions. No lymphadenopathy. No facial rash  CVS: RRR, no murmurs rubs or gallops. Equal 2+ distal pulses.   LUNGS: CTAB, no increased work of breathing  ABDOMEN:  soft NT/ND, +BS, no HSM  SKIN: No rashes or skin lesions. No nail findings  MSK:  Cervical spine: FROM  Hands: no synovitis in DIP, PIP and MCP, strong full fists  Wrist: FROM, no pain or swelling or warmth on palpation  Elbow: FROM, no pain or swelling or warmth on palpation  Shoulders: FROM, no pain or swelling or warmth on palpation  Hip: normal log roll, no lateral hip pain, DAPHNIE  test negative b/l  Knees: FROM, no warmth or effusion present. No pain with ROM.   Ankles: FROM, no pain or swelling or warmth on palpation  Feet: no pain with MTP squeeze, no toe swelling or pain or warmth on palpation with FROM  Spine: no lumbar or sacral pain on palpation.  NEURO: Cranial nerves II-XII intact grossly. 5/5 strength throughout in both upper and lower extremities, sensation intact.  PSYCH: normal mood       LABS:       Component      Latest Ref Rng & Units 11/6/2020 10/6/2020 1/30/2016   Anti-dsDNA      0 - 4 IU/mL   1     Anti-Sm      0.0-<1.0 AI   0.4     Anti-SmRNP      0.0-<1.0 AI   1.4 (H)     Anti-SS-A (Ro)      0.0-<1.0 AI   >8.0 (H)     Anti-SS-B (La)      0.0-<1.0 AI   >8.0 (H)     Anti-Centromere B      0.0-<1.0 AI   <0.2     Anti-Scl-70      0.0-<1.0 AI   <0.2     Anti-Chromatin      0.0-<1.0 AI   <0.2     Anti-Ribosomal P      0.0-<1.0 AI   <0.2     Anti-RNP      0.0-<1.0 AI   >8.0 (H)     Anti-MICKI-1      0.0-<1.0 AI   <0.2     SSA AUTOAB      <100 AU/mL     391 (H)   SSB AUTOAB      <100 AU/mL     486 (H)   Sm AUTOAB ([place])      0 - 99 AU/mL         RNP AUTOAB      <100 AU/mL     144 (H)   Scl-70 AUTOAB      0 - 99 AU/mL         MICKI-1 AUTOAB      0 - 99 AU/mL         dsDNA AUTOAB      0 - 99 IU/mL         CENTROMERE AUTOAB      0 - 99 AU/mL         HISTONE AUTOAB      0 - 99 AU/mL         DION PROFILE      NEGATIVE         COMPLEMENT C4      14.0 - 28.0 mg/dL 24.0       COMPLEMENT C3      51.0 - 160.0 mg/dl 117.0       DION SCREEN      Negative   Positive (A) Positive (A)   dsDNA Crithidia luciliae IFA      Negative Positive (A)       dsDNA Crithidia luciliae Titer      Neg<1:10 1:20 (H)          Component      Latest Ref Rng & Units 9/12/2011   Anti-dsDNA      0 - 4 IU/mL     Anti-Sm      0.0-<1.0 AI     Anti-SmRNP      0.0-<1.0 AI     Anti-SS-A (Ro)      0.0-<1.0 AI     Anti-SS-B (La)      0.0-<1.0 AI     Anti-Centromere B      0.0-<1.0 AI     Anti-Scl-70      0.0-<1.0 AI      Anti-Chromatin      0.0-<1.0 AI     Anti-Ribosomal P      0.0-<1.0 AI     Anti-RNP      0.0-<1.0 AI     Anti-MICKI-1      0.0-<1.0 AI     SSA AUTOAB      <100 AU/mL 617 (H)   SSB AUTOAB      <100 AU/mL 333 (H)   Sm AUTOAB ([place])      0 - 99 AU/mL <100   RNP AUTOAB      <100 AU/mL <100   Scl-70 AUTOAB      0 - 99 AU/mL <100   MICKI-1 AUTOAB      0 - 99 AU/mL <100   dsDNA AUTOAB      0 - 99 IU/mL <100   CENTROMERE AUTOAB      0 - 99 AU/mL <100   HISTONE AUTOAB      0 - 99 AU/mL <100   DION PROFILE      NEGATIVE POSITIVE (H)   COMPLEMENT C4      14.0 - 28.0 mg/dL 21.6   COMPLEMENT C3      51.0 - 160.0 mg/dl 104.0   DION SCREEN      Negative     dsDNA Crithidia luciliae IFA      Negative     dsDNA Crithidia luciliae Titer      Neg<1:10        Component      Latest Ref Rng & Units 11/24/2021 8/11/2021 11/6/2020   Cyclic Citrullinated Peptides IgG Quantitative      <3.0 U/mL     0.5   Cyclic Citrullinated Peptides IgG Qualitative      Negative     Negative   dsDNA Crithidia luciliae IFA      Negative Negative   Positive (A)   COMPLEMENT C3      51.0 - 160.0 mg/dl 105.0 104.0 117.0   COMPLEMENT C4      14.0 - 28.0 mg/dL 27.0 22.0 24.0   RHEUMATOID FACTOR      <=14 IU/mL     <10   Anti-dsDNA      0 - 4 IU/mL   2     SED RATE      0 - 20 mm/hr   5     C-Reactive Protein      <=0.50 mg/dL   <0.30        Imaging:     [id number]    ASSESSMENT/PLAN:     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  - RNP was also positive at [facility] but again likely falsely positive  - Was also found to have a + double-stranded DNA by crithidia  - Has been on Plaquenil alternating doses of 400 mg and 200 mg every other day.  Restarted 11/2020  - No kidney involvement  - seen by ophthalmology for Plaquenil exam 2/2024, Dr. Ashleigh Piznon   -  Blood work today and every 6 mos      Myofascial pain  - She has intermittent joint and muscle pain and diffuse achiness in her muscles  - She will try Flexeril 5 mg at night.  Advised to take it at night as it can make her sleepy    Sicca symptoms, likely Sjogren's overlap     Urinary urgency- improved  - Has had recurrent urine infections.  Has a lot of urgency at times.      Pt will f/u in 6 mos     There is a longitudinal care relationship with me, the care plan reflects the ongoing nature of the continuous relationship of care, and the medical record indicates that there is ongoing treatment of a serious/complex medical condition which I am currently managing.  is Applicable.     Beth Mercedes MD  7/9/2025  9:49 AM

## 2025-07-11 LAB — DSDNA AB TITR SER: <10 {TITER}

## 2025-08-17 DIAGNOSIS — M35.1 OVERLAP SYNDROME (HCC): ICD-10-CM

## 2025-08-17 DIAGNOSIS — M32.9 SYSTEMIC LUPUS ERYTHEMATOSUS, UNSPECIFIED SLE TYPE, UNSPECIFIED ORGAN INVOLVEMENT STATUS (HCC): ICD-10-CM

## 2025-08-18 RX ORDER — HYDROXYCHLOROQUINE SULFATE 200 MG/1
TABLET, FILM COATED ORAL
Qty: 135 TABLET | Refills: 1 | Status: SHIPPED | OUTPATIENT
Start: 2025-08-18